# Patient Record
Sex: MALE | Race: WHITE
[De-identification: names, ages, dates, MRNs, and addresses within clinical notes are randomized per-mention and may not be internally consistent; named-entity substitution may affect disease eponyms.]

---

## 2023-02-03 ENCOUNTER — OFFICE VISIT (OUTPATIENT)
Dept: INTERNAL MEDICINE CLINIC | Facility: CLINIC | Age: 48
End: 2023-02-03
Payer: COMMERCIAL

## 2023-02-03 VITALS
HEIGHT: 73 IN | WEIGHT: 195 LBS | DIASTOLIC BLOOD PRESSURE: 86 MMHG | BODY MASS INDEX: 25.84 KG/M2 | SYSTOLIC BLOOD PRESSURE: 140 MMHG

## 2023-02-03 DIAGNOSIS — E78.5 DYSLIPIDEMIA: ICD-10-CM

## 2023-02-03 DIAGNOSIS — Z86.718 HISTORY OF VENOUS THROMBOSIS: ICD-10-CM

## 2023-02-03 DIAGNOSIS — I10 PRIMARY HYPERTENSION: Primary | ICD-10-CM

## 2023-02-03 DIAGNOSIS — G62.9 PERIPHERAL POLYNEUROPATHY: ICD-10-CM

## 2023-02-03 PROCEDURE — 3077F SYST BP >= 140 MM HG: CPT | Performed by: STUDENT IN AN ORGANIZED HEALTH CARE EDUCATION/TRAINING PROGRAM

## 2023-02-03 PROCEDURE — 99204 OFFICE O/P NEW MOD 45 MIN: CPT | Performed by: STUDENT IN AN ORGANIZED HEALTH CARE EDUCATION/TRAINING PROGRAM

## 2023-02-03 PROCEDURE — 3079F DIAST BP 80-89 MM HG: CPT | Performed by: STUDENT IN AN ORGANIZED HEALTH CARE EDUCATION/TRAINING PROGRAM

## 2023-02-03 RX ORDER — AMOXICILLIN 500 MG
2 CAPSULE ORAL DAILY
COMMUNITY

## 2023-02-03 RX ORDER — LISINOPRIL 5 MG/1
5 TABLET ORAL DAILY
Qty: 30 TABLET | Refills: 2 | Status: SHIPPED | OUTPATIENT
Start: 2023-02-03

## 2023-02-03 RX ORDER — AMLODIPINE BESYLATE 10 MG/1
TABLET ORAL
COMMUNITY
Start: 2023-01-17 | End: 2023-02-03 | Stop reason: SDUPTHER

## 2023-02-03 RX ORDER — AMLODIPINE BESYLATE 10 MG/1
TABLET ORAL
Qty: 90 TABLET | Refills: 0 | Status: SHIPPED | OUTPATIENT
Start: 2023-02-03

## 2023-02-03 NOTE — PROGRESS NOTES
SUBJECTIVE:   Ollie Parker is a 49 y.o. male seen for a visit regarding   Chief Complaint   Patient presents with    New Patient     Pt here as a new patient     Hypertension        HPI: Hasn't seen doctor in years. Works as . Has wife. HTN: Diagnosed about 5 years ago. Previously had 24-hour blood pressure monitor and was told medications were not needed. Went to urgent care 1/17/23 in [place] on business trip for pre-syncope, BP was 170/110s, EKG was normal.  He was started on amlodipine. A short time later he went to ED for flushing, due to chronic leg pain a D-dimer was ordered but not done. At home, BP running 138-140/80-90s on amlodipine. Had sleep study about 3 years ago, normal.    History of superficial venous thrombosis of right thigh years ago and has since had pain in the right leg worse with exercise. Saw vascular doctor in the past. Was put on blood thinner and clot resolved. Noted to have valve reflux on doppler and ablation was recommended however patient did not want to go forward with this. R calf localized pain ongoing for several years, no leg swelling. History of lyme disease: 8 years ago Was on antibiotic protocol (?) which triggered manic episode, was jailed and tazed twice, got rhabdomyolysis from this. Trouble emptying urine: Has seen urology in the past.    Neuro-ophthalmologist believes has ocular migraine, intermittent visual disturbance on right eye for years. R sided facial numbness for a few years, on the cheek. Also has idiopathic peripheral neuropathy on the right toes described as numbness and tingling, had nerve conduction study. Saw neurologist in the past.    Chronic low back pain intermittent for about a year. Exercise induced asthma: Chest tightness at beginning of exercise.  Had PFT done in the past which was normal.    Healthcare maintenance: colonoscopy 2020    Past Medical History, Past Surgical History, Family history, Social History, and Medications were all reviewed with the patient today and updated as necessary.        Patient Active Problem List   Diagnosis    Fatigue    Bone pain    Peripheral neuropathy    Joint pain    Memory difficulties    Environmental allergies    Hoarseness    Primary hypertension    Dyslipidemia    History of venous thrombosis     Past Surgical History:   Procedure Laterality Date    CHOLECYSTECTOMY      TONSILLECTOMY       Family History   Problem Relation Age of Onset    High Blood Pressure Mother     Depression Mother     Anxiety Disorder Mother     High Cholesterol Father     High Blood Pressure Father     Heart Disease Father     Heart Attack Father 64     Social History     Socioeconomic History    Marital status:      Spouse name: Not on file    Number of children: Not on file    Years of education: Not on file    Highest education level: Not on file   Occupational History    Not on file   Tobacco Use    Smoking status: Never    Smokeless tobacco: Never   Vaping Use    Vaping Use: Never used   Substance and Sexual Activity    Alcohol use: Never    Drug use: Never    Sexual activity: Yes     Partners: Female   Other Topics Concern    Not on file   Social History Narrative    Not on file     Social Determinants of Health     Financial Resource Strain: Not on file   Food Insecurity: Not on file   Transportation Needs: Not on file   Physical Activity: Not on file   Stress: Not on file   Social Connections: Not on file   Intimate Partner Violence: Not on file   Housing Stability: Not on file     Current Outpatient Medications   Medication Sig Dispense Refill    Omega-3 Fatty Acids (FISH OIL) 1200 MG CAPS Take 2 capsules by mouth daily      NONFORMULARY Take 40 mg by mouth daily [employer] [facility] magnesium      lisinopril (PRINIVIL;ZESTRIL) 5 MG tablet Take 1 tablet by mouth daily 30 tablet 2    amLODIPine (NORVASC) 10 MG tablet TAKE 1 TABLET BY MOUTH EVERY DAY 90 tablet 0     No current facility-administered medications for this visit. Allergies as of 02/03/2023    (No Known Allergies)         Review of Systems      OBJECTIVE:    Vitals:    02/03/23 1153   BP: (!) 140/86   Weight: 195 lb (88.5 kg)   Height: 6' 0.5\" (1.842 m)        Physical Exam  Constitutional:       General: He is not in acute distress. Appearance: Normal appearance. HENT:      Head: Normocephalic and atraumatic. Eyes:      Extraocular Movements: Extraocular movements intact. Conjunctiva/sclera: Conjunctivae normal.   Cardiovascular:      Rate and Rhythm: Normal rate and regular rhythm. Heart sounds: No murmur heard. Pulmonary:      Effort: Pulmonary effort is normal.      Breath sounds: Normal breath sounds. No wheezing, rhonchi or rales. Musculoskeletal:         General: No swelling. Comments: Varicose veins on the right lower extremity, 2+ dorsalis pedis pulses bilaterally   Skin:     General: Skin is warm and dry. Neurological:      Mental Status: He is alert. Mental status is at baseline. Psychiatric:         Mood and Affect: Mood normal.         Behavior: Behavior normal.          Medical problems and test results were reviewed with the patient today. No results found for: WBC, HGB, HCT, MCV, PLT   No results found for: NA, K, CL, CO2, BUN, CREATININE, GLUCOSE, CALCIUM    No results found for: NA, K, CL, CO2, BUN, CREATININE, GLUCOSE, CALCIUM, PROT, LABALBU, BILITOT, ALKPHOS, AST, ALT, LABGLOM, GFRAA, AGRATIO, GLOB  No results found for: TSHFT4, TSH, TSHREFLEX, TVM3EJM   No results found for: CHOL  No results found for: TRIG  No results found for: HDL  No results found for: LDLCHOLESTEROL, LDLCALC  No results found for: LABVLDL, VLDL  No results found for: CHOLHDLRATIO   No results found for: LABA1C     ASSESSMENT and PLAN     1. Primary hypertension  -     Metanephrines Plasma Free; Future  -     Aldosterone & Renin, Direct with Ratio;  Future  -     Microalbumin / Creatinine Urine Ratio; Future  -     Hemoglobin A1C; Future  -     Comprehensive Metabolic Panel; Future  -     CBC with Auto Differential; Future  -     TSH with Reflex; Future  -     Urinalysis with Reflex to Culture; Future  -     lisinopril (PRINIVIL;ZESTRIL) 5 MG tablet; Take 1 tablet by mouth daily, Disp-30 tablet, R-2Normal  -     amLODIPine (NORVASC) 10 MG tablet; TAKE 1 TABLET BY MOUTH EVERY DAY, Disp-90 tablet, R-0Normal  2. Dyslipidemia  -     Lipid Panel; Future  3. History of venous thrombosis  -     [facility]  -     Vascular duplex lower extremity venous right; Future  4. Peripheral polyneuropathy     Blood pressure still above goal, will add lisinopril to amlodipine.  Evaluate for secondary hypertension.  Get labs.  Get Doppler ultrasound of the leg and refer to vascular.    Return in about 4 weeks (around 3/3/2023).     Aliya Clinton MD

## 2023-02-07 LAB
ALBUMIN SERPL-MCNC: 4.8 G/DL (ref 4–5)
ALBUMIN/CREAT UR: <8 MG/G CREAT (ref 0–29)
ALBUMIN/GLOB SERPL: 1.9 {RATIO} (ref 1.2–2.2)
ALP SERPL-CCNC: 82 IU/L (ref 44–121)
ALT SERPL-CCNC: 32 IU/L (ref 0–44)
APPEARANCE UR: CLEAR
AST SERPL-CCNC: 24 IU/L (ref 0–40)
BACTERIA #/AREA URNS HPF: NORMAL /[HPF]
BASOPHILS # BLD AUTO: 0.1 X10E3/UL (ref 0–0.2)
BASOPHILS NFR BLD AUTO: 1 %
BILIRUB SERPL-MCNC: 0.6 MG/DL (ref 0–1.2)
BILIRUB UR QL STRIP: NEGATIVE
BUN SERPL-MCNC: 11 MG/DL (ref 6–24)
BUN/CREAT SERPL: 11 (ref 9–20)
CALCIUM SERPL-MCNC: 9.3 MG/DL (ref 8.7–10.2)
CASTS URNS QL MICRO: NORMAL /LPF
CHLORIDE SERPL-SCNC: 98 MMOL/L (ref 96–106)
CHOLEST SERPL-MCNC: 212 MG/DL (ref 100–199)
CO2 SERPL-SCNC: 21 MMOL/L (ref 20–29)
COLOR UR: YELLOW
CREAT SERPL-MCNC: 1.01 MG/DL (ref 0.76–1.27)
CREAT UR-MCNC: 38.2 MG/DL
EGFRCR SERPLBLD CKD-EPI 2021: 92 ML/MIN/1.73
EOSINOPHIL # BLD AUTO: 0.2 X10E3/UL (ref 0–0.4)
EOSINOPHIL NFR BLD AUTO: 3 %
EPI CELLS #/AREA URNS HPF: NORMAL /HPF (ref 0–10)
ERYTHROCYTE [DISTWIDTH] IN BLOOD BY AUTOMATED COUNT: 13.5 % (ref 11.6–15.4)
GLOBULIN SER CALC-MCNC: 2.5 G/DL (ref 1.5–4.5)
GLUCOSE SERPL-MCNC: 97 MG/DL (ref 70–99)
GLUCOSE UR QL STRIP: NEGATIVE
HBA1C MFR BLD: 5.6 % (ref 4.8–5.6)
HCT VFR BLD AUTO: 47.5 % (ref 37.5–51)
HDLC SERPL-MCNC: 37 MG/DL
HGB BLD-MCNC: 16.6 G/DL (ref 13–17.7)
HGB UR QL STRIP: NEGATIVE
IMM GRANULOCYTES # BLD AUTO: 0 X10E3/UL (ref 0–0.1)
IMM GRANULOCYTES NFR BLD AUTO: 0 %
KETONES UR QL STRIP: NEGATIVE
LDLC SERPL CALC-MCNC: 153 MG/DL (ref 0–99)
LEUKOCYTE ESTERASE UR QL STRIP: ABNORMAL
LYMPHOCYTES # BLD AUTO: 2.6 X10E3/UL (ref 0.7–3.1)
LYMPHOCYTES NFR BLD AUTO: 44 %
MCH RBC QN AUTO: 31.6 PG (ref 26.6–33)
MCHC RBC AUTO-ENTMCNC: 34.9 G/DL (ref 31.5–35.7)
MCV RBC AUTO: 91 FL (ref 79–97)
MICRO URNS: ABNORMAL
MICROALBUMIN UR-MCNC: <3 UG/ML
MONOCYTES # BLD AUTO: 0.5 X10E3/UL (ref 0.1–0.9)
MONOCYTES NFR BLD AUTO: 9 %
NEUTROPHILS # BLD AUTO: 2.6 X10E3/UL (ref 1.4–7)
NEUTROPHILS NFR BLD AUTO: 43 %
NITRITE UR QL STRIP: NEGATIVE
PH UR STRIP: 6.5 [PH] (ref 5–7.5)
PLATELET # BLD AUTO: 234 X10E3/UL (ref 150–450)
POTASSIUM SERPL-SCNC: 4 MMOL/L (ref 3.5–5.2)
PROT SERPL-MCNC: 7.3 G/DL (ref 6–8.5)
PROT UR QL STRIP: NEGATIVE
RBC # BLD AUTO: 5.25 X10E6/UL (ref 4.14–5.8)
RBC #/AREA URNS HPF: NORMAL /HPF (ref 0–2)
SODIUM SERPL-SCNC: 136 MMOL/L (ref 134–144)
SP GR UR STRIP: 1.01 (ref 1–1.03)
SPECIMEN STATUS REPORT: NORMAL
TRIGL SERPL-MCNC: 122 MG/DL (ref 0–149)
TSH SERPL DL<=0.005 MIU/L-ACNC: 3.28 UIU/ML (ref 0.45–4.5)
UROBILINOGEN UR STRIP-MCNC: 0.2 MG/DL (ref 0.2–1)
VLDLC SERPL CALC-MCNC: 22 MG/DL (ref 5–40)
WBC # BLD AUTO: 6 X10E3/UL (ref 3.4–10.8)
WBC #/AREA URNS HPF: NORMAL /HPF (ref 0–5)

## 2023-02-08 LAB — IMP & REVIEW OF LAB RESULTS: NORMAL

## 2023-02-11 ENCOUNTER — PATIENT MESSAGE (OUTPATIENT)
Dept: INTERNAL MEDICINE CLINIC | Facility: CLINIC | Age: 48
End: 2023-02-11

## 2023-02-12 LAB
METANEPH FREE SERPL-MCNC: 24.4 PG/ML (ref 0–88)
NORMETANEPHRINE SERPL-MCNC: 102.7 PG/ML (ref 0–218.9)

## 2023-02-17 RX ORDER — AMLODIPINE BESYLATE 5 MG/1
5 TABLET ORAL DAILY
Qty: 30 TABLET | Refills: 0 | Status: SHIPPED | OUTPATIENT
Start: 2023-02-17

## 2023-02-28 ENCOUNTER — INITIAL CONSULT (OUTPATIENT)
Dept: CARDIOLOGY CLINIC | Age: 48
End: 2023-02-28
Payer: COMMERCIAL

## 2023-02-28 VITALS
HEIGHT: 73 IN | HEART RATE: 62 BPM | WEIGHT: 196.2 LBS | DIASTOLIC BLOOD PRESSURE: 70 MMHG | SYSTOLIC BLOOD PRESSURE: 120 MMHG | BODY MASS INDEX: 26 KG/M2

## 2023-02-28 DIAGNOSIS — R55 NEAR SYNCOPE: ICD-10-CM

## 2023-02-28 DIAGNOSIS — R07.9 CHEST PAIN, UNSPECIFIED TYPE: ICD-10-CM

## 2023-02-28 DIAGNOSIS — E78.5 DYSLIPIDEMIA: ICD-10-CM

## 2023-02-28 DIAGNOSIS — I49.3 ASYMPTOMATIC PVCS: ICD-10-CM

## 2023-02-28 DIAGNOSIS — I10 PRIMARY HYPERTENSION: Primary | ICD-10-CM

## 2023-02-28 PROCEDURE — 3078F DIAST BP <80 MM HG: CPT | Performed by: INTERNAL MEDICINE

## 2023-02-28 PROCEDURE — 99203 OFFICE O/P NEW LOW 30 MIN: CPT | Performed by: INTERNAL MEDICINE

## 2023-02-28 PROCEDURE — 3074F SYST BP LT 130 MM HG: CPT | Performed by: INTERNAL MEDICINE

## 2023-02-28 PROCEDURE — 93000 ELECTROCARDIOGRAM COMPLETE: CPT | Performed by: INTERNAL MEDICINE

## 2023-02-28 ASSESSMENT — ENCOUNTER SYMPTOMS
SPUTUM PRODUCTION: 0
HOARSE VOICE: 0
CHANGE IN BOWEL HABIT: 0
NAUSEA: 0
VISUAL CHANGE: 0
COLOR CHANGE: 0
ORTHOPNEA: 0
WHEEZING: 0
COUGH: 0
SORE THROAT: 0
BOWEL INCONTINENCE: 0
HEMATEMESIS: 0
BACK PAIN: 0
ABDOMINAL PAIN: 0
HEMOPTYSIS: 0
VOMITING: 0
SWOLLEN GLANDS: 0
DIARRHEA: 0
HEMATOCHEZIA: 0
SHORTNESS OF BREATH: 0
BLURRED VISION: 0

## 2023-02-28 NOTE — PROGRESS NOTES
[facility] CARDIOLOGY  [address] [facility], [address] [facility], [address] [place] [address]  PHONE: [phone]        23    NAME:  Davis Arroyo  : 1975  MRN: [id number]       SUBJECTIVE:   Davis Arroyo is a 49 y.o. male seen for a consultation visit regarding the following: The patient is referred by Dr Yohannes Gastelum for evaluation of chest pain and near syncope. Chief Complaint   Patient presents with    Consultation    Loss of Consciousness    Hypertension            HPI:  Consultation is requested by [unfilled] for evaluation of Consultation, Loss of Consciousness, and Hypertension   . Dizziness  This is a recurrent problem. Episode onset: 4 years ago. Extensively evaluated in [facility] with CT,MRI,stress MPI scan,echo,and ilt table testing with apparently negative results. Episode frequency: Resolved 2 months with hypertension treatment. Prior to hypertension management,episodes occurred every month or so. The problem has been resolved. Associated symptoms include chest pain, fatigue and numbness (occasional hand numbness). Pertinent negatives include no abdominal pain, anorexia, arthralgias, change in bowel habit, chills, congestion, coughing, diaphoresis, fever, headaches, joint swelling, myalgias, nausea, neck pain, rash, sore throat, swollen glands, urinary symptoms, vertigo, visual change, vomiting or weakness. Nothing aggravates the symptoms. Chest Pain   This is a recurrent problem. Episode onset: 4 years ago. The problem occurs rarely. The problem has been resolved (Resolved 2 months with hypertension control). The pain is at a severity of 1/10. The pain is mild. The quality of the pain is described as dull. The pain does not radiate. Associated symptoms include dizziness, near-syncope and numbness (occasional hand numbness).  Pertinent negatives include no abdominal pain, back pain, claudication, cough, diaphoresis, exertional chest pressure, fever, headaches, hemoptysis, irregular heartbeat, leg pain, lower extremity edema, malaise/fatigue, nausea, orthopnea, palpitations, PND, shortness of breath, sputum production, syncope, vomiting or weakness. Past Medical History, Past Surgical History, Family history, Social History, and Medications were all reviewed with the patient today and updated as necessary. No Known Allergies    Current Outpatient Medications:     amLODIPine (NORVASC) 5 MG tablet, Take 1 tablet by mouth daily, Disp: 30 tablet, Rfl: 0    Omega-3 Fatty Acids (FISH OIL) 1200 MG CAPS, Take 2 capsules by mouth daily, Disp: , Rfl:     NONFORMULARY, Take 40 mg by mouth daily [employer] magnesium, Disp: , Rfl:     lisinopril (PRINIVIL;ZESTRIL) 5 MG tablet, Take 1 tablet by mouth daily, Disp: 30 tablet, Rfl: 2  Past Medical History:   Diagnosis Date    Allergic rhinitis     Anxiety     Asthma     Back problem     Blood clot in vein     GERD (gastroesophageal reflux disease)     Hyperlipidemia     Hypertension     Syncope      Past Surgical History:   Procedure Laterality Date    CHOLECYSTECTOMY      TONSILLECTOMY       Family History   Problem Relation Age of Onset    High Blood Pressure Mother     Depression Mother     Anxiety Disorder Mother     High Cholesterol Father     High Blood Pressure Father     Heart Disease Father     Heart Attack Father 64     Social History     Tobacco Use    Smoking status: Never    Smokeless tobacco: Never   Substance Use Topics    Alcohol use: Never       ROS:    Review of Systems   Constitutional: Positive for fatigue. Negative for chills, decreased appetite, diaphoresis, fever and malaise/fatigue. HENT:  Negative for congestion, hearing loss, hoarse voice, nosebleeds and sore throat. Eyes:  Negative for blurred vision. Cardiovascular:  Positive for chest pain and near-syncope. Negative for claudication, cyanosis, dyspnea on exertion, irregular heartbeat, leg swelling, orthopnea, palpitations, paroxysmal nocturnal dyspnea and syncope. Respiratory:  Negative for cough, hemoptysis, shortness of breath, sputum production and wheezing. Endocrine: Negative for polydipsia, polyphagia and polyuria. Skin:  Negative for color change and rash. Musculoskeletal:  Negative for arthralgias, back pain, joint swelling, myalgias and neck pain. Gastrointestinal:  Negative for abdominal pain, anorexia, change in bowel habit, bowel incontinence, diarrhea, hematemesis, hematochezia, nausea and vomiting. Genitourinary:  Negative for dysuria, frequency and hematuria. Neurological:  Positive for dizziness and numbness (occasional hand numbness). Negative for focal weakness, headaches, light-headedness, loss of balance, sensory change, vertigo and weakness. Psychiatric/Behavioral:  Negative for altered mental status and memory loss. PHYSICAL EXAM:   /70   Pulse 62   Ht 6' 1\" (1.854 m)   Wt 196 lb 3.2 oz (89 kg)   BMI 25.89 kg/m²      Physical Exam  Constitutional:       Appearance: Normal appearance. HENT:      Head: Normocephalic and atraumatic. Nose: Nose normal.   Eyes:      Extraocular Movements: Extraocular movements intact. Pupils: Pupils are equal, round, and reactive to light. Neck:      Vascular: No carotid bruit. Cardiovascular:      Rate and Rhythm: Regular rhythm. Pulses: Normal pulses. Heart sounds: No murmur heard. Comments: Occasional extrasystole  Pulmonary:      Effort: Pulmonary effort is normal.      Breath sounds: Normal breath sounds. Abdominal:      General: Abdomen is flat. Bowel sounds are normal.      Palpations: Abdomen is soft. Musculoskeletal:         General: Normal range of motion. Cervical back: Normal range of motion and neck supple. Comments: Mild varicose veins on RLE   Skin:     General: Skin is warm and dry. Neurological:      General: No focal deficit present. Mental Status: He is alert and oriented to person, place, and time.    Psychiatric: Mood and Affect: Mood normal.       Medical problems and test results were reviewed with the patient today.      Results for orders placed or performed in visit on 02/28/23   EKG 12 lead    Impression    Sinus  Rhythm  - frequent multiform ectopic ventricular beats   # VECs = 2, # types 2  BORDERLINE RHYTHM         Recent Results (from the past 672 hour(s))   CBC/DIFF AMBIGUOUS DEFAULT    Collection Time: 02/06/23  7:47 AM   Result Value Ref Range    WBC 6.0 3.4 - 10.8 x10E3/uL    RBC 5.25 4.14 - 5.80 x10E6/uL    Hemoglobin 16.6 13.0 - 17.7 g/dL    Hematocrit 47.5 37.5 - 51.0 %    MCV 91 79 - 97 fL    MCH 31.6 26.6 - 33.0 pg    MCHC 34.9 31.5 - 35.7 g/dL    RDW 13.5 11.6 - 15.4 %    Platelets 830 650 - 352 x10E3/uL    Neutrophils % 43 Not Estab. %    Lymphocytes % 44 Not Estab. %    Monocytes % 9 Not Estab. %    Eosinophils % 3 Not Estab. %    Basophils % 1 Not Estab. %    Neutrophils Absolute 2.6 1.4 - 7.0 x10E3/uL    Lymphocytes Absolute 2.6 0.7 - 3.1 x10E3/uL    Monocytes Absolute 0.5 0.1 - 0.9 x10E3/uL    Eosinophils Absolute 0.2 0.0 - 0.4 x10E3/uL    Basophils Absolute 0.1 0.0 - 0.2 x10E3/uL    Immature Granulocytes 0 Not Estab. %    Immature Grans (Abs) 0.0 0.0 - 0.1 x10E3/uL   Comprehensive Metabolic Panel    Collection Time: 02/06/23  7:47 AM   Result Value Ref Range    Glucose 97 70 - 99 mg/dL    BUN 11 6 - 24 mg/dL    Creatinine 1.01 0.76 - 1.27 mg/dL    Est, Glomerular Filtration Rate 92 >59 mL/min/1.73    BUN/Creatinine Ratio 11 9 - 20    Sodium 136 134 - 144 mmol/L    Potassium 4.0 3.5 - 5.2 mmol/L    Chloride 98 96 - 106 mmol/L    CO2 21 20 - 29 mmol/L    Calcium 9.3 8.7 - 10.2 mg/dL    Total Protein 7.3 6.0 - 8.5 g/dL    Albumin 4.8 4.0 - 5.0 g/dL    Globulin, Total 2.5 1.5 - 4.5 g/dL    Albumin/Globulin Ratio 1.9 1.2 - 2.2    Total Bilirubin 0.6 0.0 - 1.2 mg/dL    Alkaline Phosphatase 82 44 - 121 IU/L    AST 24 0 - 40 IU/L    ALT 32 0 - 44 IU/L   Urinalysis with Microscopic    Collection Time: 02/06/23  7:47 AM   Result Value Ref Range    Specific Gravity, UA 1.008 1.005 - 1.030    pH, UA 6.5 5.0 - 7.5    Color, Urine Yellow Yellow    Appearance Clear Clear    Leukocyte Esterase, Urine Trace (A) Negative    Protein, UA Negative Negative/Trace    Glucose, Ur Negative Negative    Ketones, Urine Negative Negative    Blood, Urine Negative Negative    Bilirubin Urine Negative Negative    Urobilinogen, Urine 0.2 0.2 - 1.0 mg/dL    Nitrite, Urine Negative Negative    Microscopic Examination See additional order    Microscopic Examination    Collection Time: 02/06/23  7:47 AM   Result Value Ref Range    WBC, UA None seen 0 - 5 /hpf    RBC, UA None seen 0 - 2 /hpf    Epithelial Cells, UA None seen 0 - 10 /hpf    Casts UA None seen None seen /lpf    Bacteria, UA None seen None seen/Few   Lipid Panel    Collection Time: 02/06/23  7:47 AM   Result Value Ref Range    Cholesterol 212 (H) 100 - 199 mg/dL    Triglycerides 122 0 - 149 mg/dL    HDL 37 (L) >39 mg/dL    VLDL Cholesterol Calculated 22 5 - 40 mg/dL    LDL Calculated 153 (H) 0 - 99 mg/dL   Hemoglobin A1C    Collection Time: 02/06/23  7:47 AM   Result Value Ref Range    Hemoglobin A1C 5.6 4.8 - 5.6 %   TSH reflex to T4    Collection Time: 02/06/23  7:47 AM   Result Value Ref Range    TSH 3.280 0.450 - 4.500 uIU/mL   SPECIMEN STATUS REPORT    Collection Time: 02/06/23  7:47 AM   Result Value Ref Range    Specimen Status Report COMMENT    Microalbumin / Creatinine Urine Ratio    Collection Time: 02/06/23  7:47 AM   Result Value Ref Range    Creatinine, Ur 38.2 Not Estab. mg/dL    Albumin, U <3.0 Not Estab. ug/mL    Microalbumin Creatinine Ratio <8 0 - 29 mg/g creat   Cardiovascular Report    Collection Time: 02/06/23  7:47 AM   Result Value Ref Range    Interpretation Note    Metanephrines Plasma Free    Collection Time: 02/06/23  7:47 AM   Result Value Ref Range    Normetanephrine 102.7 0.0 - 218.9 pg/mL    Metanephrine, Plasma 24.4 0.0 - 88.0 pg/mL   Aldosterone & Renin, Direct with Ratio    Collection Time: 02/06/23  7:47 AM   Result Value Ref Range    Aldosterone 5.4 0.0 - 30.0 ng/dL    Renin Activity 2.676 0.167 - 5.380 ng/mL/hr    ALDOSTERONE/RENIN RATIO 2.0 0.0 - 30.0     Lab Results   Component Value Date/Time    CHOL 212 02/06/2023 07:47 AM    HDL 37 02/06/2023 07:47 AM       ASSESSMENT and January Nunu was seen today for consultation, loss of consciousness and hypertension. Diagnoses and all orders for this visit:    Primary hypertension:Stable and at goal.Continue Lisinopril and Amlodipine.  -     EKG 12 lead    Dyslipidemia    Near syncope  -     Stress echocardiogram (TTE) exercise with contrast, bubble, strain, and 3D PRN; Future  -     Extended cardiac holter monitor (48 hrs - 15 days); Future    Asymptomatic PVCs  -     Stress echocardiogram (TTE) exercise with contrast, bubble, strain, and 3D PRN; Future  -     Extended cardiac holter monitor (48 hrs - 15 days); Future   Chest Pain:Resolved. -Stress Echo. Disposition:  Return for Follow up after Echo, Follow up after procedure. Thank you for allowing me to participate in this patient's care. Please call or contact me if there are any questions or concerns regarding the above.       Lilliam Levine MD  02/28/23  12:12 PM

## 2023-03-03 ENCOUNTER — OFFICE VISIT (OUTPATIENT)
Dept: INTERNAL MEDICINE CLINIC | Facility: CLINIC | Age: 48
End: 2023-03-03
Payer: COMMERCIAL

## 2023-03-03 VITALS
SYSTOLIC BLOOD PRESSURE: 132 MMHG | BODY MASS INDEX: 25.71 KG/M2 | DIASTOLIC BLOOD PRESSURE: 80 MMHG | HEART RATE: 64 BPM | OXYGEN SATURATION: 98 % | HEIGHT: 73 IN | WEIGHT: 194 LBS

## 2023-03-03 DIAGNOSIS — E78.00 PURE HYPERCHOLESTEROLEMIA: ICD-10-CM

## 2023-03-03 DIAGNOSIS — I10 ESSENTIAL HYPERTENSION: Primary | ICD-10-CM

## 2023-03-03 DIAGNOSIS — R39.11 URINARY HESITANCY: ICD-10-CM

## 2023-03-03 PROCEDURE — 3079F DIAST BP 80-89 MM HG: CPT | Performed by: STUDENT IN AN ORGANIZED HEALTH CARE EDUCATION/TRAINING PROGRAM

## 2023-03-03 PROCEDURE — 99214 OFFICE O/P EST MOD 30 MIN: CPT | Performed by: STUDENT IN AN ORGANIZED HEALTH CARE EDUCATION/TRAINING PROGRAM

## 2023-03-03 PROCEDURE — 3075F SYST BP GE 130 - 139MM HG: CPT | Performed by: STUDENT IN AN ORGANIZED HEALTH CARE EDUCATION/TRAINING PROGRAM

## 2023-03-03 SDOH — ECONOMIC STABILITY: FOOD INSECURITY: WITHIN THE PAST 12 MONTHS, THE FOOD YOU BOUGHT JUST DIDN'T LAST AND YOU DIDN'T HAVE MONEY TO GET MORE.: NEVER TRUE

## 2023-03-03 SDOH — ECONOMIC STABILITY: TRANSPORTATION INSECURITY
IN THE PAST 12 MONTHS, HAS LACK OF TRANSPORTATION KEPT YOU FROM MEETINGS, WORK, OR FROM GETTING THINGS NEEDED FOR DAILY LIVING?: NO

## 2023-03-03 SDOH — ECONOMIC STABILITY: INCOME INSECURITY: HOW HARD IS IT FOR YOU TO PAY FOR THE VERY BASICS LIKE FOOD, HOUSING, MEDICAL CARE, AND HEATING?: NOT HARD AT ALL

## 2023-03-03 SDOH — ECONOMIC STABILITY: HOUSING INSECURITY
IN THE LAST 12 MONTHS, WAS THERE A TIME WHEN YOU DID NOT HAVE A STEADY PLACE TO SLEEP OR SLEPT IN A SHELTER (INCLUDING NOW)?: NO

## 2023-03-03 SDOH — ECONOMIC STABILITY: FOOD INSECURITY: WITHIN THE PAST 12 MONTHS, YOU WORRIED THAT YOUR FOOD WOULD RUN OUT BEFORE YOU GOT MONEY TO BUY MORE.: NEVER TRUE

## 2023-03-03 ASSESSMENT — PATIENT HEALTH QUESTIONNAIRE - PHQ9
SUM OF ALL RESPONSES TO PHQ QUESTIONS 1-9: 0
SUM OF ALL RESPONSES TO PHQ QUESTIONS 1-9: 0
2. FEELING DOWN, DEPRESSED OR HOPELESS: 0
SUM OF ALL RESPONSES TO PHQ9 QUESTIONS 1 & 2: 0
SUM OF ALL RESPONSES TO PHQ QUESTIONS 1-9: 0
SUM OF ALL RESPONSES TO PHQ QUESTIONS 1-9: 0
1. LITTLE INTEREST OR PLEASURE IN DOING THINGS: 0

## 2023-03-03 ASSESSMENT — ENCOUNTER SYMPTOMS
VOMITING: 0
ABDOMINAL PAIN: 0
NAUSEA: 0
SHORTNESS OF BREATH: 0

## 2023-03-03 NOTE — PROGRESS NOTES
FOLLOW UP VISIT    Subjective:    Cameron Cabrera (: 1975) is a 49 y.o., male,   Chief Complaint   Patient presents with    Hypertension       HPI:    HTN: Taking lisinopril, amlodipine. At home, BP has been 117/70, up to 130s rarely. BP in office today 132/80, is usually slightly higher in doctor's office. Muscle spasm/fluttering of the left ear. Ongoing for about 2 weeks. He wonders if this is side effect from antihypertensive. Occurring almost daily. No ear pain, discharge. Labs reviewed. , metanephrines and aldosterone renin ratio unremarkable. Labs otherwise unremarkable. Father had MI age 62 or 61    Difficulty initiating urination, ongoing for years and is frustrating. No nocturia. Urinalysis was normal.  History of hydroceles. Chest pain, dizziness: Extensive work-up in [place] years ago. he has heart monitor, is going to get stress echo with cardiology. R calf pain, history of superficial venous thrombosis of R thigh treated with blood thinner: Went to [facility] vein, seeing Dr. Sabino Kathleen this month to have vein cauterization. History of dysphagia: saw GI in the past, was told he had hernia (hiatal?). Healthcare maintenance: Started walking. Colonoscopy , found polyp    Other medical history:  -History of lyme disease: 8 years ago was on antibiotic protocol (?) which triggered manic episode, was jailed and tazed twice, got rhabdomyolysis from this.  -Neuro-ophthalmologist believes has ocular migraine, intermittent visual disturbance on right eye for years.  -R sided facial numbness for a few years, on the cheek. Also has idiopathic peripheral neuropathy on the right toes described as numbness and tingling, had nerve conduction study. Saw neurologist in the past.  -Chronic low back pain intermittent for about a year. Exercise induced asthma: Chest tightness at beginning of exercise.  Had PFT done in the past which was normal.  The following portions of the patient's history were reviewed and updated as appropriate:      Patient Active Problem List   Diagnosis    Fatigue    Bone pain    Peripheral neuropathy    Joint pain    Memory difficulties    Environmental allergies    Hoarseness    Essential hypertension    Dyslipidemia    History of venous thrombosis    Near syncope    Asymptomatic PVCs    Chest pain    Pure hypercholesterolemia    Urinary hesitancy     Past Surgical History:   Procedure Laterality Date    CHOLECYSTECTOMY      TONSILLECTOMY       Family History   Problem Relation Age of Onset    High Blood Pressure Mother     Depression Mother     Anxiety Disorder Mother     High Cholesterol Father     High Blood Pressure Father     Heart Disease Father     Heart Attack Father 64     Social History     Socioeconomic History    Marital status:      Spouse name: Not on file    Number of children: Not on file    Years of education: Not on file    Highest education level: Not on file   Occupational History    Not on file   Tobacco Use    Smoking status: Never    Smokeless tobacco: Never   Vaping Use    Vaping Use: Never used   Substance and Sexual Activity    Alcohol use: Never    Drug use: Never    Sexual activity: Yes     Partners: Female   Other Topics Concern    Not on file   Social History Narrative    Not on file     Social Determinants of Health     Financial Resource Strain: Not on file   Food Insecurity: Not on file   Transportation Needs: Not on file   Physical Activity: Not on file   Stress: Not on file   Social Connections: Not on file   Intimate Partner Violence: Not on file   Housing Stability: Not on file     Current Outpatient Medications   Medication Sig Dispense Refill    amLODIPine (NORVASC) 5 MG tablet Take 1 tablet by mouth daily 30 tablet 0    Omega-3 Fatty Acids (FISH OIL) 1200 MG CAPS Take 2 capsules by mouth daily      NONFORMULARY Take 40 mg by mouth daily [employer]' Best magnesium       No current facility-administered medications for this visit. Allergies as of 03/03/2023    (No Known Allergies)       Review of Systems   Constitutional:  Negative for chills and fever. Respiratory:  Negative for shortness of breath. Cardiovascular:  Negative for chest pain. Gastrointestinal:  Negative for abdominal pain, nausea and vomiting. Objective:    Vitals:    03/03/23 1033   BP: 132/80   Site: Left Upper Arm   Position: Sitting   Cuff Size: Large Adult   Pulse: 64   SpO2: 98%   Weight: 194 lb (88 kg)   Height: 6' 1\" (1.854 m)        Physical Exam  Constitutional:       General: He is not in acute distress. Appearance: Normal appearance. HENT:      Head: Normocephalic and atraumatic. Eyes:      Extraocular Movements: Extraocular movements intact. Conjunctiva/sclera: Conjunctivae normal.   Cardiovascular:      Rate and Rhythm: Normal rate and regular rhythm. Heart sounds: No murmur heard. Pulmonary:      Effort: Pulmonary effort is normal.      Breath sounds: Normal breath sounds. No wheezing, rhonchi or rales. Skin:     General: Skin is warm and dry. Neurological:      Mental Status: He is alert. Mental status is at baseline.    Psychiatric:         Mood and Affect: Mood normal.         Behavior: Behavior normal.       Lab Results   Component Value Date    WBC 6.0 02/06/2023    HGB 16.6 02/06/2023    HCT 47.5 02/06/2023    MCV 91 02/06/2023     02/06/2023      Lab Results   Component Value Date/Time     02/06/2023 07:47 AM    K 4.0 02/06/2023 07:47 AM    CL 98 02/06/2023 07:47 AM    CO2 21 02/06/2023 07:47 AM    BUN 11 02/06/2023 07:47 AM    CREATININE 1.01 02/06/2023 07:47 AM    GLUCOSE 97 02/06/2023 07:47 AM    CALCIUM 9.3 02/06/2023 07:47 AM       Lab Results   Component Value Date     02/06/2023    K 4.0 02/06/2023    CL 98 02/06/2023    CO2 21 02/06/2023    BUN 11 02/06/2023    CREATININE 1.01 02/06/2023    GLUCOSE 97 02/06/2023    CALCIUM 9.3 02/06/2023    PROT 7.3 02/06/2023    LABALBU 4.8 02/06/2023    LABALBU <3.0 02/06/2023    BILITOT 0.6 02/06/2023    ALKPHOS 82 02/06/2023    AST 24 02/06/2023    ALT 32 02/06/2023    LABGLOM 92 02/06/2023    AGRATIO 1.9 02/06/2023     Lab Results   Component Value Date    TSH 3.280 02/06/2023      Lab Results   Component Value Date    CHOL 212 (H) 02/06/2023     Lab Results   Component Value Date    TRIG 122 02/06/2023     Lab Results   Component Value Date    HDL 37 (L) 02/06/2023     Lab Results   Component Value Date    LDLCALC 153 (H) 02/06/2023     Lab Results   Component Value Date    LABVLDL 22 02/06/2023     No results found for: CHOLHDLRATIO   Hemoglobin A1C   Date Value Ref Range Status   02/06/2023 5.6 4.8 - 5.6 % Final     Comment:              Prediabetes: 5.7 - 6.4           Diabetes: >6.4           Glycemic control for adults with diabetes: <7.0          Assessent & Plan    1. Essential hypertension  2. Pure hypercholesterolemia  3. Urinary hesitancy  -     [facility], [place]     10-year risk of ASCVD is 5.3%, less than 7.5% therefore no compelling reason for statin currently. We discussed dietary measures cholesterol. Due to family history, may consider statin in the future. Reasonable to stop lisinopril and monitor blood pressures at home to see if ear muscle spasm improves, he will let us know if it does not or blood pressure increases. Refer to urology for urinary symptoms. The 10-year ASCVD risk score (Jules FONSECA, et al., 2019) is: 5.3%    Values used to calculate the score:      Age: 49 years      Sex: Male      Is Non- : No      Diabetic: No      Tobacco smoker: No      Systolic Blood Pressure: 020 mmHg      Is BP treated: Yes      HDL Cholesterol: 37 mg/dL      Total Cholesterol: 212 mg/dL      The patient and/or patient representative voiced understanding and agreement with the current diagnoses, recommendations, and possible side effects.     Return in about 3 months (around 6/3/2023) for physical.     Lori Casey MD

## 2023-03-20 RX ORDER — AMLODIPINE BESYLATE 5 MG/1
TABLET ORAL
Qty: 90 TABLET | Refills: 1 | Status: SHIPPED | OUTPATIENT
Start: 2023-03-20

## 2023-04-21 ENCOUNTER — OFFICE VISIT (OUTPATIENT)
Dept: UROLOGY | Age: 48
End: 2023-04-21

## 2023-04-21 DIAGNOSIS — E78.5 DYSLIPIDEMIA: Primary | ICD-10-CM

## 2023-04-21 DIAGNOSIS — N50.82 SCROTAL PAIN: ICD-10-CM

## 2023-04-21 LAB
BILIRUBIN, URINE, POC: NEGATIVE
BLOOD URINE, POC: NEGATIVE
GLUCOSE URINE, POC: NEGATIVE
KETONES, URINE, POC: NEGATIVE
LEUKOCYTE ESTERASE, URINE, POC: NEGATIVE
NITRITE, URINE, POC: NEGATIVE
PH, URINE, POC: 6.5 (ref 4.6–8)
PROTEIN,URINE, POC: NEGATIVE
SPECIFIC GRAVITY, URINE, POC: 1.02 (ref 1–1.03)
URINALYSIS CLARITY, POC: NORMAL
URINALYSIS COLOR, POC: NORMAL
UROBILINOGEN, POC: NORMAL

## 2023-04-21 RX ORDER — CIPROFLOXACIN 500 MG/1
500 TABLET, FILM COATED ORAL 2 TIMES DAILY
Qty: 14 TABLET | Refills: 0 | Status: SHIPPED | OUTPATIENT
Start: 2023-04-21 | End: 2023-04-28

## 2023-04-21 ASSESSMENT — ENCOUNTER SYMPTOMS
HEARTBURN: 0
ABDOMINAL PAIN: 0
SKIN LESIONS: 0
INDIGESTION: 0
DIARRHEA: 0
CONSTIPATION: 0
BACK PAIN: 0
SHORTNESS OF BREATH: 0
EYE DISCHARGE: 0
BLOOD IN STOOL: 0
VOMITING: 0
COUGH: 0
NAUSEA: 0
EYE PAIN: 0
WHEEZING: 0

## 2023-04-21 NOTE — PROGRESS NOTES
[facility]  [address]  [address] [facility] [address] [facility]  [phone]    Lawrence Daniels  : 1975    Chief Complaint   Patient presents with    New Patient        HPI     Lawrence Daniels is a 49 y.o. male was last seen by me in 2016; note: transferring care from [facility] for prostatitis and LUTS. Last course of antibiotics for prostatitis was 2015 with Dr. Susanne Rea. Symptoms usually improve on antibiotics. He currently is having a dull ache in rectal ache, weak stream, RLQ pain and left upper back pain. Also pain with intercourse. Drink about 4-5 cups of coffee per day. No history of hernias. Denies family history of prostate cancer. He has tried TXU Luis Antonio but not alpha blockers. Most recent PSA 0.7 on 14. He has had an extensive urologic work-up Including cystoscopy and urodymaics several year by Dr. Lisa Stevens (these records are not available). Also a renal US at [facility]. Was given Cipro and Tamsulosin in 11-15 by NP. He is better today,did not take the Tamuslosin. Is concerned about a spermatocele which he has had for some time. It is occasionally painful, no pain today. Referred back by Dr. Trista Landon for evaluation and treatment of urinary hesitancy. He has urinary hesitancy since his 20's,. Has left groin pain. , bilateral low back pain. No dysuria. Has pain in left scrotum after sex.    Past Medical History:   Diagnosis Date    Allergic rhinitis     Anxiety     Asthma     Back problem     Blood clot in vein     GERD (gastroesophageal reflux disease)     Hyperlipidemia     Hypertension      Past Surgical History:   Procedure Laterality Date    CHOLECYSTECTOMY      TONSILLECTOMY       Current Outpatient Medications   Medication Sig Dispense Refill    ciprofloxacin (CIPRO) 500 MG tablet Take 1 tablet by mouth 2 times daily for 7 days 14 tablet 0    lisinopril (PRINIVIL;ZESTRIL) 5 MG tablet Take 1 tablet by mouth daily      metoprolol succinate (TOPROL XL)

## 2023-04-28 ENCOUNTER — HOSPITAL ENCOUNTER (OUTPATIENT)
Dept: ULTRASOUND IMAGING | Age: 48
End: 2023-04-28
Payer: COMMERCIAL

## 2023-04-28 DIAGNOSIS — N50.82 SCROTAL PAIN: ICD-10-CM

## 2023-04-28 PROCEDURE — 76870 US EXAM SCROTUM: CPT

## 2023-05-03 DIAGNOSIS — I10 ESSENTIAL HYPERTENSION: ICD-10-CM

## 2023-05-03 RX ORDER — LISINOPRIL 5 MG/1
TABLET ORAL
Qty: 90 TABLET | Refills: 1 | Status: SHIPPED | OUTPATIENT
Start: 2023-05-03 | End: 2023-06-09 | Stop reason: ALTCHOICE

## 2023-05-09 ENCOUNTER — TELEPHONE (OUTPATIENT)
Dept: UROLOGY | Age: 48
End: 2023-05-09

## 2023-05-23 ENCOUNTER — OFFICE VISIT (OUTPATIENT)
Age: 48
End: 2023-05-23
Payer: COMMERCIAL

## 2023-05-23 VITALS
WEIGHT: 194 LBS | SYSTOLIC BLOOD PRESSURE: 132 MMHG | DIASTOLIC BLOOD PRESSURE: 78 MMHG | HEART RATE: 60 BPM | HEIGHT: 73 IN | BODY MASS INDEX: 25.71 KG/M2

## 2023-05-23 DIAGNOSIS — I10 ESSENTIAL HYPERTENSION: Primary | ICD-10-CM

## 2023-05-23 DIAGNOSIS — I49.3 FREQUENT PVCS: ICD-10-CM

## 2023-05-23 PROCEDURE — 3078F DIAST BP <80 MM HG: CPT | Performed by: INTERNAL MEDICINE

## 2023-05-23 PROCEDURE — 99213 OFFICE O/P EST LOW 20 MIN: CPT | Performed by: INTERNAL MEDICINE

## 2023-05-23 PROCEDURE — 3075F SYST BP GE 130 - 139MM HG: CPT | Performed by: INTERNAL MEDICINE

## 2023-05-23 ASSESSMENT — ENCOUNTER SYMPTOMS
SPUTUM PRODUCTION: 0
BLURRED VISION: 0
ORTHOPNEA: 0
HEMATEMESIS: 0
HOARSE VOICE: 0
BOWEL INCONTINENCE: 0
ABDOMINAL PAIN: 0
VOMITING: 0
COUGH: 0
NAUSEA: 0
HEMATOCHEZIA: 0
COLOR CHANGE: 0
WHEEZING: 0
SHORTNESS OF BREATH: 0
DIARRHEA: 0

## 2023-05-23 NOTE — PROGRESS NOTES
[facility] CARDIOLOGY  [address] [facility], [address]  PHONE: [phone]        23        NAME:  Whitney Hameed  : 1975  MRN: [id number]       SUBJECTIVE:   Whitney Hameed is a 49 y.o. male seen for a follow up visit regarding the following: Frequent PVC's and primary hypertension. Last month,low dose Toprol was added due to frequent PVC's (12.1% burden on recent extended holter monitor). He returns for follow up after medication change. Overall,he reports feeling the same. Chief Complaint   Patient presents with    Hypertension       HPI:    Palpitations   The problem has been unchanged. Nothing aggravates the symptoms. Associated symptoms include chest pain (occasioanl dull left sided chest pain that is worse with sugar consumption?). Pertinent negatives include no anxiety, chest fullness, coughing, diaphoresis, dizziness, fever, irregular heartbeat, malaise/fatigue, nausea, near-syncope, numbness, shortness of breath, syncope, vomiting or weakness. Past Medical History, Past Surgical History, Family history, Social History, and Medications were all reviewed with the patient today and updated as necessary.          Current Outpatient Medications:     lisinopril (PRINIVIL;ZESTRIL) 5 MG tablet, TAKE 1 TABLET BY MOUTH EVERY DAY, Disp: 90 tablet, Rfl: 1    metoprolol succinate (TOPROL XL) 25 MG extended release tablet, Take 1 tablet by mouth daily, Disp: 30 tablet, Rfl: 5    amLODIPine (NORVASC) 5 MG tablet, TAKE 1 TABLET BY MOUTH EVERY DAY, Disp: 90 tablet, Rfl: 1    NONFORMULARY, Take 40 mg by mouth daily [employer]' Best magnesium, Disp: , Rfl:     Omega-3 Fatty Acids (FISH OIL) 1200 MG CAPS, Take 2,400 mg by mouth daily (Patient not taking: Reported on 2023), Disp: , Rfl:   No Known Allergies  Past Medical History:   Diagnosis Date    Allergic rhinitis     Anxiety     Asthma     Back problem     Blood clot in vein     GERD (gastroesophageal reflux disease)

## 2023-06-02 DIAGNOSIS — E78.5 DYSLIPIDEMIA: ICD-10-CM

## 2023-06-02 DIAGNOSIS — I10 PRIMARY HYPERTENSION: ICD-10-CM

## 2023-06-02 LAB
ALBUMIN SERPL-MCNC: 4.1 G/DL (ref 3.5–5)
ALBUMIN/GLOB SERPL: 1.2 (ref 0.4–1.6)
ALP SERPL-CCNC: 85 U/L (ref 50–136)
ALT SERPL-CCNC: 29 U/L (ref 12–65)
ANION GAP SERPL CALC-SCNC: 5 MMOL/L (ref 2–11)
APPEARANCE UR: CLEAR
AST SERPL-CCNC: 19 U/L (ref 15–37)
BACTERIA URNS QL MICRO: NEGATIVE /HPF
BASOPHILS # BLD: 0.1 K/UL (ref 0–0.2)
BASOPHILS NFR BLD: 1 % (ref 0–2)
BILIRUB SERPL-MCNC: 0.7 MG/DL (ref 0.2–1.1)
BILIRUB UR QL: NEGATIVE
BUN SERPL-MCNC: 11 MG/DL (ref 6–23)
CALCIUM SERPL-MCNC: 10.2 MG/DL (ref 8.3–10.4)
CASTS URNS QL MICRO: NORMAL /LPF (ref 0–2)
CHLORIDE SERPL-SCNC: 104 MMOL/L (ref 101–110)
CHOLEST SERPL-MCNC: 211 MG/DL
CO2 SERPL-SCNC: 28 MMOL/L (ref 21–32)
COLOR UR: NORMAL
CREAT SERPL-MCNC: 1.1 MG/DL (ref 0.8–1.5)
CREAT UR-MCNC: 28 MG/DL
DIFFERENTIAL METHOD BLD: NORMAL
EOSINOPHIL # BLD: 0.2 K/UL (ref 0–0.8)
EOSINOPHIL NFR BLD: 3 % (ref 0.5–7.8)
EPI CELLS #/AREA URNS HPF: NORMAL /HPF (ref 0–5)
ERYTHROCYTE [DISTWIDTH] IN BLOOD BY AUTOMATED COUNT: 12.7 % (ref 11.9–14.6)
EST. AVERAGE GLUCOSE BLD GHB EST-MCNC: 108 MG/DL
GLOBULIN SER CALC-MCNC: 3.3 G/DL (ref 2.8–4.5)
GLUCOSE SERPL-MCNC: 88 MG/DL (ref 65–100)
GLUCOSE UR STRIP.AUTO-MCNC: NEGATIVE MG/DL
HBA1C MFR BLD: 5.4 % (ref 4.8–5.6)
HCT VFR BLD AUTO: 48.1 % (ref 41.1–50.3)
HDLC SERPL-MCNC: 38 MG/DL (ref 40–60)
HDLC SERPL: 5.6
HGB BLD-MCNC: 16.2 G/DL (ref 13.6–17.2)
HGB UR QL STRIP: NEGATIVE
IMM GRANULOCYTES # BLD AUTO: 0 K/UL (ref 0–0.5)
IMM GRANULOCYTES NFR BLD AUTO: 0 % (ref 0–5)
KETONES UR QL STRIP.AUTO: NEGATIVE MG/DL
LDLC SERPL CALC-MCNC: 146 MG/DL
LEUKOCYTE ESTERASE UR QL STRIP.AUTO: NEGATIVE
LYMPHOCYTES # BLD: 2.2 K/UL (ref 0.5–4.6)
LYMPHOCYTES NFR BLD: 39 % (ref 13–44)
MCH RBC QN AUTO: 31.5 PG (ref 26.1–32.9)
MCHC RBC AUTO-ENTMCNC: 33.7 G/DL (ref 31.4–35)
MCV RBC AUTO: 93.6 FL (ref 82–102)
MICROALBUMIN UR-MCNC: <0.5 MG/DL
MICROALBUMIN/CREAT UR-RTO: NORMAL MG/G (ref 0–30)
MONOCYTES # BLD: 0.5 K/UL (ref 0.1–1.3)
MONOCYTES NFR BLD: 9 % (ref 4–12)
MUCOUS THREADS URNS QL MICRO: 0 /LPF
NEUTS SEG # BLD: 2.7 K/UL (ref 1.7–8.2)
NEUTS SEG NFR BLD: 48 % (ref 43–78)
NITRITE UR QL STRIP.AUTO: NEGATIVE
NRBC # BLD: 0 K/UL (ref 0–0.2)
PH UR STRIP: 7 (ref 5–9)
PLATELET # BLD AUTO: 186 K/UL (ref 150–450)
PMV BLD AUTO: 11.7 FL (ref 9.4–12.3)
POTASSIUM SERPL-SCNC: 4.2 MMOL/L (ref 3.5–5.1)
PROT SERPL-MCNC: 7.4 G/DL (ref 6.3–8.2)
PROT UR STRIP-MCNC: NEGATIVE MG/DL
RBC # BLD AUTO: 5.14 M/UL (ref 4.23–5.6)
RBC #/AREA URNS HPF: NORMAL /HPF (ref 0–5)
SODIUM SERPL-SCNC: 137 MMOL/L (ref 133–143)
SP GR UR REFRACTOMETRY: 1 (ref 1–1.02)
TRIGL SERPL-MCNC: 135 MG/DL (ref 35–150)
TSH W FREE THYROID IF ABNORMAL: 2 UIU/ML (ref 0.36–3.74)
URINE CULTURE IF INDICATED: NORMAL
UROBILINOGEN UR QL STRIP.AUTO: 0.2 EU/DL (ref 0.2–1)
VLDLC SERPL CALC-MCNC: 27 MG/DL (ref 6–23)
WBC # BLD AUTO: 5.7 K/UL (ref 4.3–11.1)
WBC URNS QL MICRO: NORMAL /HPF (ref 0–4)

## 2023-06-09 ENCOUNTER — OFFICE VISIT (OUTPATIENT)
Dept: INTERNAL MEDICINE CLINIC | Facility: CLINIC | Age: 48
End: 2023-06-09
Payer: COMMERCIAL

## 2023-06-09 VITALS
WEIGHT: 190.8 LBS | SYSTOLIC BLOOD PRESSURE: 108 MMHG | HEIGHT: 73 IN | BODY MASS INDEX: 25.29 KG/M2 | DIASTOLIC BLOOD PRESSURE: 68 MMHG | HEART RATE: 50 BPM | OXYGEN SATURATION: 97 %

## 2023-06-09 DIAGNOSIS — I10 ESSENTIAL HYPERTENSION: ICD-10-CM

## 2023-06-09 DIAGNOSIS — I49.3 FREQUENT PVCS: ICD-10-CM

## 2023-06-09 DIAGNOSIS — Z12.11 ENCOUNTER FOR SCREENING FOR MALIGNANT NEOPLASM OF COLON: ICD-10-CM

## 2023-06-09 DIAGNOSIS — E78.5 DYSLIPIDEMIA: ICD-10-CM

## 2023-06-09 DIAGNOSIS — J34.2 DEVIATED SEPTUM: ICD-10-CM

## 2023-06-09 DIAGNOSIS — Z00.00 ANNUAL PHYSICAL EXAM: Primary | ICD-10-CM

## 2023-06-09 DIAGNOSIS — Z86.718 HISTORY OF VENOUS THROMBOSIS: ICD-10-CM

## 2023-06-09 PROBLEM — E78.00 PURE HYPERCHOLESTEROLEMIA: Status: RESOLVED | Noted: 2023-03-03 | Resolved: 2023-06-09

## 2023-06-09 LAB
METANEPH FREE SERPL-MCNC: 41.7 PG/ML (ref 0–88)
NORMETANEPHRINE SERPL-MCNC: 146 PG/ML (ref 0–218.9)

## 2023-06-09 PROCEDURE — 3074F SYST BP LT 130 MM HG: CPT | Performed by: STUDENT IN AN ORGANIZED HEALTH CARE EDUCATION/TRAINING PROGRAM

## 2023-06-09 PROCEDURE — 99396 PREV VISIT EST AGE 40-64: CPT | Performed by: STUDENT IN AN ORGANIZED HEALTH CARE EDUCATION/TRAINING PROGRAM

## 2023-06-09 PROCEDURE — 3078F DIAST BP <80 MM HG: CPT | Performed by: STUDENT IN AN ORGANIZED HEALTH CARE EDUCATION/TRAINING PROGRAM

## 2023-06-09 ASSESSMENT — PATIENT HEALTH QUESTIONNAIRE - PHQ9
SUM OF ALL RESPONSES TO PHQ9 QUESTIONS 1 & 2: 0
SUM OF ALL RESPONSES TO PHQ QUESTIONS 1-9: 0
SUM OF ALL RESPONSES TO PHQ QUESTIONS 1-9: 0
1. LITTLE INTEREST OR PLEASURE IN DOING THINGS: 0
SUM OF ALL RESPONSES TO PHQ QUESTIONS 1-9: 0
SUM OF ALL RESPONSES TO PHQ QUESTIONS 1-9: 0
2. FEELING DOWN, DEPRESSED OR HOPELESS: 0

## 2023-06-09 ASSESSMENT — ENCOUNTER SYMPTOMS: SHORTNESS OF BREATH: 0

## 2023-06-09 NOTE — PROGRESS NOTES
Hemoglobin A1C   Date Value Ref Range Status   06/02/2023 5.4 4.8 - 5.6 % Final        Assessent & Plan    1. Annual physical exam  2. Deviated septum  -     [address] [facility], [place]  3. Encounter for screening for malignant neoplasm of colon  -     External Referral To Gastroenterology  4. Essential hypertension  5. Dyslipidemia  6. Frequent PVCs  7. History of venous thrombosis     Encouraged to follow-up with his vascular doctor for bruise. I feel that he would benefit from statin, I encouraged for him to discuss further with his cardiologist or if repeat calcium score would be warranted. Would be helpful to have records of prior calcium scores. Refer to ENT for history of deviated nasal septum. Refer to GI for screening colonoscopy. Discussed BMI and healthy weight and diet, exercise, and medication compliance. Reviewed appropriate health maintenance screening. The patient was counseled regarding screening procedures and recommended schedules for GI hemoccult testing/colonoscopy, and recommended vaccinations. The patient and/or patient representative voiced understanding and agreement with the current diagnoses, recommendations, and possible side effects. Return in about 6 months (around 12/9/2023) for with fasting labs 1 week prior, routine follow up.      Lamar Jean MD

## 2023-06-11 LAB
ALDOST SERPL-MCNC: 3.4 NG/DL (ref 0–30)
ALDOST/RENIN PLAS-RTO: 3 (ref 0–30)
RENIN PLAS-CCNC: 1.14 NG/ML/HR (ref 0.17–5.38)

## 2023-06-22 ENCOUNTER — OFFICE VISIT (OUTPATIENT)
Dept: ENT CLINIC | Age: 48
End: 2023-06-22
Payer: COMMERCIAL

## 2023-06-22 VITALS — BODY MASS INDEX: 25.31 KG/M2 | HEIGHT: 73 IN | WEIGHT: 191 LBS

## 2023-06-22 DIAGNOSIS — J34.2 NASAL SEPTAL DEVIATION: ICD-10-CM

## 2023-06-22 DIAGNOSIS — J30.9 ALLERGIC RHINITIS, UNSPECIFIED SEASONALITY, UNSPECIFIED TRIGGER: ICD-10-CM

## 2023-06-22 DIAGNOSIS — M26.609 TMJ (TEMPOROMANDIBULAR JOINT DISORDER): ICD-10-CM

## 2023-06-22 DIAGNOSIS — K21.9 LARYNGOPHARYNGEAL REFLUX (LPR): Primary | ICD-10-CM

## 2023-06-22 DIAGNOSIS — H93.8X3 EAR PRESSURE, BILATERAL: ICD-10-CM

## 2023-06-22 DIAGNOSIS — R49.0 HOARSENESS: ICD-10-CM

## 2023-06-22 PROCEDURE — 92567 TYMPANOMETRY: CPT | Performed by: STUDENT IN AN ORGANIZED HEALTH CARE EDUCATION/TRAINING PROGRAM

## 2023-06-22 PROCEDURE — 99204 OFFICE O/P NEW MOD 45 MIN: CPT | Performed by: STUDENT IN AN ORGANIZED HEALTH CARE EDUCATION/TRAINING PROGRAM

## 2023-06-22 PROCEDURE — 31575 DIAGNOSTIC LARYNGOSCOPY: CPT | Performed by: STUDENT IN AN ORGANIZED HEALTH CARE EDUCATION/TRAINING PROGRAM

## 2023-06-22 RX ORDER — FLUTICASONE PROPIONATE 50 MCG
2 SPRAY, SUSPENSION (ML) NASAL DAILY
Qty: 48 G | Refills: 5 | Status: SHIPPED | OUTPATIENT
Start: 2023-06-22

## 2023-06-22 RX ORDER — OMEPRAZOLE 40 MG/1
40 CAPSULE, DELAYED RELEASE ORAL
Qty: 90 CAPSULE | Refills: 1 | Status: SHIPPED | OUTPATIENT
Start: 2023-06-22 | End: 2023-06-22 | Stop reason: SDUPTHER

## 2023-06-22 RX ORDER — OMEPRAZOLE 40 MG/1
40 CAPSULE, DELAYED RELEASE ORAL
Qty: 90 CAPSULE | Refills: 5 | Status: SHIPPED | OUTPATIENT
Start: 2023-06-22

## 2023-06-22 ASSESSMENT — ENCOUNTER SYMPTOMS
SINUS PRESSURE: 1
ABDOMINAL PAIN: 0
COUGH: 1
EYE DISCHARGE: 0

## 2023-06-22 NOTE — PROGRESS NOTES
[address] ENT Office Note    Patient: Frankie Macario  MRN: [id number]  : 1975  Gender:  male  Vital Signs: Ht 6' 1\" (1.854 m)   Wt 191 lb (86.6 kg)   BMI 25.20 kg/m²   Date: 2023    CC:   Chief Complaint   Patient presents with    New Patient     Patient here for ear issues and sinus pressure. He also states he has a raspy voice and some nodules in his throat. HPI:  Frankie Macario is a 49 y.o. male who has dysphonia. He endorses throat tightness. This has been ongoing for 3 years. He endorses right ear pain. He has pressure in his ears. He has occasional. He clenches his jaw and grinds his. He has GERD and takes meds PRN. He denies dysphagia. He denies nasal obstruction. Past Medical History, Past Surgical History, Family history, Social History, and Medications were all reviewed with the patient today and updated as necessary. No Known Allergies  Patient Active Problem List   Diagnosis    Fatigue    Bone pain    Peripheral neuropathy    Joint pain    Memory difficulties    Environmental allergies    Hoarseness    Essential hypertension    Dyslipidemia    History of venous thrombosis    Near syncope    Frequent PVCs    Chest pain    Urinary hesitancy    Deviated septum     Current Outpatient Medications   Medication Sig    omeprazole (PRILOSEC) 40 MG delayed release capsule Take 1 capsule by mouth every morning (before breakfast)    fluticasone (FLONASE) 50 MCG/ACT nasal spray 2 sprays by Each Nostril route daily    metoprolol succinate (TOPROL XL) 25 MG extended release tablet Take 1 tablet by mouth daily    amLODIPine (NORVASC) 5 MG tablet TAKE 1 TABLET BY MOUTH EVERY DAY    Omega-3 Fatty Acids (FISH OIL) 1200 MG CAPS Take 2,400 mg by mouth daily    NONFORMULARY Take 40 mg by mouth daily [employer]' Best magnesium     No current facility-administered medications for this visit.      Past Medical History:   Diagnosis Date    Allergic rhinitis     Anxiety     Asthma     Back problem     Blood

## 2023-08-16 ENCOUNTER — TELEPHONE (OUTPATIENT)
Age: 48
End: 2023-08-16

## 2023-08-16 NOTE — TELEPHONE ENCOUNTER
----- Message from Jarrod [place], [place] sent at 8/16/2023  8:00 AM EDT -----  Regarding: FW: Metoprolol Succ ER 25 Mg  Contact: [phone]    ----- Message -----  From: Agusto Diehl MA  Sent: 8/16/2023   7:40 AM EDT  To: Alli Mcarthur MA  Subject: FW: Metoprolol Succ ER 25 Mg                       ----- Message -----  From: Bryanna Berumen  Sent: 8/15/2023   6:13 PM EDT  To: [facility] Clinical Staff  Subject: Metoprolol Succ ER 25 Mg                         Dr. Ivon Ricketts -    I had to stop taking this medicine as it was causing me unusual tiredness and extreme moments of fatigue during daytime. Was concerned about dosing off at wheel while driving as well. Please advise on a path forward.     Thanks,  Sharon Lopez

## 2023-08-16 NOTE — TELEPHONE ENCOUNTER
Per Dr. James Nair, can stay off the Metoprolol for now. Called/LMOM regarding above, can stay off Metoprolol for now. If palpitations become worse to contact our office or if has questions.

## 2023-08-16 NOTE — TELEPHONE ENCOUNTER
----- Message from Carlo Sotoviki [place] sent at 8/16/2023  8:00 AM EDT -----  Regarding: FW: Metoprolol Succ ER 25 Mg  Contact: [phone]    ----- Message -----  From: Leena Call MA  Sent: 8/16/2023   7:40 AM EDT  To: Martha Yan MA  Subject: FW: Metoprolol Succ ER 25 Mg                       ----- Message -----  From: Paula Mora  Sent: 8/15/2023   6:13 PM EDT  To: [facility] Clinical Staff  Subject: Metoprolol Succ ER 25 Mg                         Dr. Matthew Boo -    I had to stop taking this medicine as it was causing me unusual tiredness and extreme moments of fatigue during daytime. Was concerned about dosing off at wheel while driving as well. Please advise on a path forward.     Thanks,  Bill Lara

## 2023-08-16 NOTE — TELEPHONE ENCOUNTER
----- Message from Jarrod Mejias, [address] sent at 8/16/2023  8:00 AM EDT -----  Regarding: FW: Metoprolol Succ ER 25 Mg  Contact: [phone]    ----- Message -----  From: Agusto Diehl MA  Sent: 8/16/2023   7:40 AM EDT  To: Alli Mcarthur MA  Subject: FW: Metoprolol Succ ER 25 Mg                       ----- Message -----  From: Bryanna Berumen  Sent: 8/15/2023   6:13 PM EDT  To: [facility] Clinical Staff  Subject: Metoprolol Succ ER 25 Mg                         Dr. Ivon Ricketts -    I had to stop taking this medicine as it was causing me unusual tiredness and extreme moments of fatigue during daytime. Was concerned about dosing off at wheel while driving as well. Please advise on a path forward.     Thanks,  Sharon Lopez

## 2023-08-16 NOTE — TELEPHONE ENCOUNTER
Called s/w pt. Pt states that he has noticed some tiredness/sleepiness during the day as well as lower back pain for the past week and a half. Concern with dosing off at the wheel. Begin taking Metoprolol succ 25mg 1 QD  in April for palpitations. States has not noticed much of a difference w/ the palpitations since beginning the Metoprolol. Pt states ok to [facility].

## 2023-08-16 NOTE — TELEPHONE ENCOUNTER
----- Message from Lavonna Romberg, [address] sent at 8/16/2023  8:00 AM EDT -----  Regarding: FW: Metoprolol Succ ER 25 Mg  Contact: [phone]    ----- Message -----  From: Lisa Izquierdo MA  Sent: 8/16/2023   7:40 AM EDT  To: Pantera Elliott MA  Subject: FW: Metoprolol Succ ER 25 Mg                       ----- Message -----  From: Norbert Donato  Sent: 8/15/2023   6:13 PM EDT  To: [facility] Clinical Staff  Subject: Metoprolol Succ ER 25 Mg                         Dr. Mykel Ma -    I had to stop taking this medicine as it was causing me unusual tiredness and extreme moments of fatigue during daytime. Was concerned about dosing off at wheel while driving as well. Please advise on a path forward.     Thanks,  Aníbal Potter

## 2023-08-25 ENCOUNTER — OFFICE VISIT (OUTPATIENT)
Dept: UROLOGY | Age: 48
End: 2023-08-25
Payer: COMMERCIAL

## 2023-08-25 DIAGNOSIS — N40.1 BPH WITH OBSTRUCTION/LOWER URINARY TRACT SYMPTOMS: ICD-10-CM

## 2023-08-25 DIAGNOSIS — I86.1 VARICOCELE: ICD-10-CM

## 2023-08-25 DIAGNOSIS — N13.8 BPH WITH OBSTRUCTION/LOWER URINARY TRACT SYMPTOMS: ICD-10-CM

## 2023-08-25 DIAGNOSIS — N50.82 SCROTAL PAIN: ICD-10-CM

## 2023-08-25 DIAGNOSIS — E78.5 DYSLIPIDEMIA: Primary | ICD-10-CM

## 2023-08-25 DIAGNOSIS — R39.11 URINARY HESITANCY: ICD-10-CM

## 2023-08-25 LAB
BILIRUBIN, URINE, POC: NEGATIVE
BLOOD URINE, POC: NORMAL
GLUCOSE URINE, POC: NEGATIVE
KETONES, URINE, POC: NEGATIVE
LEUKOCYTE ESTERASE, URINE, POC: NORMAL
NITRITE, URINE, POC: NEGATIVE
PH, URINE, POC: 7 (ref 4.6–8)
PROTEIN,URINE, POC: NEGATIVE
SPECIFIC GRAVITY, URINE, POC: 1 (ref 1–1.03)
URINALYSIS CLARITY, POC: NORMAL
URINALYSIS COLOR, POC: NORMAL
UROBILINOGEN, POC: NORMAL

## 2023-08-25 PROCEDURE — 99213 OFFICE O/P EST LOW 20 MIN: CPT | Performed by: UROLOGY

## 2023-08-25 PROCEDURE — 81003 URINALYSIS AUTO W/O SCOPE: CPT | Performed by: UROLOGY

## 2023-08-25 RX ORDER — ALFUZOSIN HYDROCHLORIDE 10 MG/1
10 TABLET, EXTENDED RELEASE ORAL DAILY
Qty: 30 TABLET | Refills: 11 | Status: SHIPPED | OUTPATIENT
Start: 2023-08-25

## 2023-08-25 NOTE — PROGRESS NOTES
[facility]  [address]  [phone]    Malaika Little  : 1975    Chief Complaint   Patient presents with    Results        HPI     Malaika Little is a 51 y.o. male    was last seen by me in 2016; note: transferring care from [facility] for prostatitis and LUTS. Last course of antibiotics for prostatitis was 2015 with Dr. De La Rosa Neighbours. Symptoms usually improve on antibiotics. He currently is having a dull ache in rectal ache, weak stream, RLQ pain and left upper back pain. Also pain with intercourse. Drink about 4-5 cups of coffee per day. No history of hernias. Denies family history of prostate cancer. He has tried TXU Luis Antonio but not alpha blockers. Most recent PSA 0.7 on 14. He has had an extensive urologic work-up Including cystoscopy and urodynamics several year by Dr. Maria Ines Hi (these records are not available). Also a renal US at [facility]. Was given Cipro and Tamsulosin in 11-15 by NP. He is better today,did not take the Tamuslosin. Is concerned about a spermatocele which he has had for some time. It is occasionally painful, no pain today. Referred back by Dr. Kiran Simon for evaluation and treatment of urinary hesitancy. He has urinary hesitancy since his 20's,. Has left groin pain. , bilateral low back pain. No dysuria. Has pain in left scrotum after sex. Exam showed varicocele and spermatocele on left. He was treated with Cipro in . Scrotal US in : FINDINGS: The right testicle measures 5.4 x 2.4 x 3.1 cm while the left testicle  measures 4.4 x 2.4 x 3.0 cm. There is normal flow and echogenicity within the  testicles. There is a cyst versus spermatocele within the left epididymal head  measuring 1.7 cm. There is normal flow within the epididymides. Bilateral  hydroceles are present, right greater than left. There is also a left-sided  varicocele present. IMPRESSION:  1.  Bilateral hydroceles, right greater

## 2023-10-03 RX ORDER — AMLODIPINE BESYLATE 5 MG/1
TABLET ORAL
Qty: 90 TABLET | Refills: 3 | Status: SHIPPED | OUTPATIENT
Start: 2023-10-03

## 2023-10-24 ENCOUNTER — OFFICE VISIT (OUTPATIENT)
Age: 48
End: 2023-10-24
Payer: COMMERCIAL

## 2023-10-24 VITALS
DIASTOLIC BLOOD PRESSURE: 80 MMHG | BODY MASS INDEX: 25.58 KG/M2 | HEIGHT: 73 IN | HEART RATE: 78 BPM | WEIGHT: 193 LBS | SYSTOLIC BLOOD PRESSURE: 116 MMHG

## 2023-10-24 DIAGNOSIS — I49.3 FREQUENT PVCS: Primary | ICD-10-CM

## 2023-10-24 DIAGNOSIS — I10 ESSENTIAL HYPERTENSION: ICD-10-CM

## 2023-10-24 DIAGNOSIS — R07.9 CHEST PAIN, UNSPECIFIED TYPE: ICD-10-CM

## 2023-10-24 PROCEDURE — 3074F SYST BP LT 130 MM HG: CPT | Performed by: INTERNAL MEDICINE

## 2023-10-24 PROCEDURE — 3079F DIAST BP 80-89 MM HG: CPT | Performed by: INTERNAL MEDICINE

## 2023-10-24 PROCEDURE — 99214 OFFICE O/P EST MOD 30 MIN: CPT | Performed by: INTERNAL MEDICINE

## 2023-10-24 ASSESSMENT — ENCOUNTER SYMPTOMS
BACK PAIN: 0
HEMATEMESIS: 0
VOMITING: 0
COLOR CHANGE: 0
WHEEZING: 0
SPUTUM PRODUCTION: 0
DIARRHEA: 0
NAUSEA: 0
BOWEL INCONTINENCE: 0
HEMATOCHEZIA: 0
HOARSE VOICE: 0
ABDOMINAL PAIN: 0
SHORTNESS OF BREATH: 0
COUGH: 0
ORTHOPNEA: 0
BLURRED VISION: 0

## 2023-10-24 NOTE — PROGRESS NOTES
[facility] CARDIOLOGY  [id number] [place] [address] [facility]  PHONE: [phone]        10/24/23        NAME:  Tramaine Regan  : 1975  MRN: [id number]       SUBJECTIVE:   Tramaine Regan is a 51 y.o. male seen for a follow up visit regarding the following: The patient has  frequent PVC's & primary hypertension. He returns for scheduled follow up. He reports intolerance to Toprol due to excessive fatigue. He admits to occasional unexplained brief chest pain. He states that he had an abnormal cardiac calcium score in the past.He does not remember what his SEBASTIAN was. Chief Complaint   Patient presents with    Hypertension       HPI:    Hypertension  This is a chronic problem. The problem is unchanged. The problem is controlled. Associated symptoms include chest pain and palpitations (rare). Pertinent negatives include no anxiety, blurred vision, headaches, malaise/fatigue, neck pain, orthopnea, peripheral edema, PND, shortness of breath or sweats. Chest Pain   This is a recurrent problem. The onset quality is gradual. The problem occurs rarely. The problem has been resolved. The pain is present in the substernal region. The pain is at a severity of 1/10. The pain is mild. The quality of the pain is described as dull. The pain does not radiate. Associated symptoms include palpitations (rare). Pertinent negatives include no abdominal pain, back pain, claudication, cough, diaphoresis, dizziness, exertional chest pressure, fever, headaches, irregular heartbeat, leg pain, lower extremity edema, malaise/fatigue, nausea, near-syncope, numbness, orthopnea, PND, shortness of breath, sputum production, syncope, vomiting or weakness. Past Medical History, Past Surgical History, Family history, Social History, and Medications were all reviewed with the patient today and updated as necessary.          Current Outpatient Medications:     amLODIPine (NORVASC) 5 MG tablet, TAKE 1 TABLET BY MOUTH EVERY DAY,

## 2024-02-20 ENCOUNTER — OFFICE VISIT (OUTPATIENT)
Age: 49
End: 2024-02-20
Payer: COMMERCIAL

## 2024-02-20 VITALS
WEIGHT: 193 LBS | HEIGHT: 72 IN | BODY MASS INDEX: 26.14 KG/M2 | DIASTOLIC BLOOD PRESSURE: 86 MMHG | HEART RATE: 67 BPM | SYSTOLIC BLOOD PRESSURE: 130 MMHG

## 2024-02-20 DIAGNOSIS — I49.3 FREQUENT PVCS: ICD-10-CM

## 2024-02-20 DIAGNOSIS — R07.9 CHEST PAIN, UNSPECIFIED TYPE: Primary | ICD-10-CM

## 2024-02-20 DIAGNOSIS — I10 ESSENTIAL HYPERTENSION: ICD-10-CM

## 2024-02-20 PROCEDURE — 3075F SYST BP GE 130 - 139MM HG: CPT | Performed by: INTERNAL MEDICINE

## 2024-02-20 PROCEDURE — 93000 ELECTROCARDIOGRAM COMPLETE: CPT | Performed by: INTERNAL MEDICINE

## 2024-02-20 PROCEDURE — 3079F DIAST BP 80-89 MM HG: CPT | Performed by: INTERNAL MEDICINE

## 2024-02-20 PROCEDURE — 99213 OFFICE O/P EST LOW 20 MIN: CPT | Performed by: INTERNAL MEDICINE

## 2024-02-20 ASSESSMENT — ENCOUNTER SYMPTOMS
BOWEL INCONTINENCE: 0
HEMATEMESIS: 0
BACK PAIN: 0
SHORTNESS OF BREATH: 0
COLOR CHANGE: 0
ORTHOPNEA: 0
SPUTUM PRODUCTION: 0
ABDOMINAL PAIN: 0
DIARRHEA: 0
HOARSE VOICE: 0
BLURRED VISION: 0
HEMOPTYSIS: 0
NAUSEA: 0
WHEEZING: 0
VOMITING: 0
COUGH: 0
HEMATOCHEZIA: 0

## 2024-02-20 NOTE — PROGRESS NOTES
HENT:      Head: Normocephalic and atraumatic.      Nose: Nose normal.   Eyes:      Extraocular Movements: Extraocular movements intact.      Pupils: Pupils are equal, round, and reactive to light.   Neck:      Vascular: No carotid bruit.   Cardiovascular:      Rate and Rhythm: Regular rhythm.      Pulses: Normal pulses.      Heart sounds: No murmur heard.     Comments: Occasional extrasystole  Pulmonary:      Effort: Pulmonary effort is normal.      Breath sounds: Normal breath sounds.   Abdominal:      General: Abdomen is flat. Bowel sounds are normal.      Palpations: Abdomen is soft.   Musculoskeletal:         General: Normal range of motion.      Cervical back: Normal range of motion and neck supple.   Skin:     General: Skin is warm and dry.   Neurological:      General: No focal deficit present.      Mental Status: He is alert and oriented to person, place, and time.   Psychiatric:         Mood and Affect: Mood normal.         Medical problems and test results were reviewed with the patient today.     No results found for this or any previous visit (from the past 672 hour(s)).  Lab Results   Component Value Date/Time    CHOL 211 06/02/2023 09:14 AM    HDL 38 06/02/2023 09:14 AM     No results found for any visits on 02/20/24.    ASSESSMENT and PLAN    Goldy was seen today for irregular heart beat.    Diagnoses and all orders for this visit:    Chest pain, unspecified type:Atypical.Normal stress MPI scan is c/w CV risk.Continue heart healthy lifestyle and PCP follow up.  -     EKG 12 lead    Frequent PVCs/PAC's:Declines Beta blockers.Consider switching Norvasc to Cardizem in future if symptoms worsen.He admits to minimal symptoms at this time.    Essential hypertension:Stable and at goal with Norvasc.Continue home BP monitoring.          Disposition:    Return if symptoms worsen or fail to improve.                PATITO BAH MD  2/20/2024  11:27 AM

## 2024-04-09 SDOH — ECONOMIC STABILITY: INCOME INSECURITY: HOW HARD IS IT FOR YOU TO PAY FOR THE VERY BASICS LIKE FOOD, HOUSING, MEDICAL CARE, AND HEATING?: NOT HARD AT ALL

## 2024-04-09 SDOH — ECONOMIC STABILITY: FOOD INSECURITY: WITHIN THE PAST 12 MONTHS, THE FOOD YOU BOUGHT JUST DIDN'T LAST AND YOU DIDN'T HAVE MONEY TO GET MORE.: NEVER TRUE

## 2024-04-09 SDOH — ECONOMIC STABILITY: FOOD INSECURITY: WITHIN THE PAST 12 MONTHS, YOU WORRIED THAT YOUR FOOD WOULD RUN OUT BEFORE YOU GOT MONEY TO BUY MORE.: NEVER TRUE

## 2024-04-09 ASSESSMENT — PATIENT HEALTH QUESTIONNAIRE - PHQ9
1. LITTLE INTEREST OR PLEASURE IN DOING THINGS: NOT AT ALL
SUM OF ALL RESPONSES TO PHQ QUESTIONS 1-9: 0
SUM OF ALL RESPONSES TO PHQ QUESTIONS 1-9: 0
SUM OF ALL RESPONSES TO PHQ9 QUESTIONS 1 & 2: 0
SUM OF ALL RESPONSES TO PHQ9 QUESTIONS 1 & 2: 0
1. LITTLE INTEREST OR PLEASURE IN DOING THINGS: NOT AT ALL
2. FEELING DOWN, DEPRESSED OR HOPELESS: NOT AT ALL
SUM OF ALL RESPONSES TO PHQ QUESTIONS 1-9: 0
SUM OF ALL RESPONSES TO PHQ QUESTIONS 1-9: 0
2. FEELING DOWN, DEPRESSED OR HOPELESS: NOT AT ALL

## 2024-04-12 ENCOUNTER — OFFICE VISIT (OUTPATIENT)
Dept: INTERNAL MEDICINE CLINIC | Facility: CLINIC | Age: 49
End: 2024-04-12
Payer: COMMERCIAL

## 2024-04-12 VITALS
HEART RATE: 71 BPM | DIASTOLIC BLOOD PRESSURE: 84 MMHG | WEIGHT: 170 LBS | HEIGHT: 73 IN | OXYGEN SATURATION: 98 % | SYSTOLIC BLOOD PRESSURE: 132 MMHG | BODY MASS INDEX: 22.53 KG/M2

## 2024-04-12 DIAGNOSIS — I49.3 FREQUENT PVCS: ICD-10-CM

## 2024-04-12 DIAGNOSIS — I10 ESSENTIAL HYPERTENSION: Primary | ICD-10-CM

## 2024-04-12 PROCEDURE — 99214 OFFICE O/P EST MOD 30 MIN: CPT | Performed by: INTERNAL MEDICINE

## 2024-04-12 PROCEDURE — 3079F DIAST BP 80-89 MM HG: CPT | Performed by: INTERNAL MEDICINE

## 2024-04-12 PROCEDURE — 3075F SYST BP GE 130 - 139MM HG: CPT | Performed by: INTERNAL MEDICINE

## 2024-04-12 RX ORDER — DILTIAZEM HYDROCHLORIDE 180 MG/1
180 CAPSULE, COATED, EXTENDED RELEASE ORAL DAILY
Qty: 30 CAPSULE | Refills: 3 | Status: SHIPPED | OUTPATIENT
Start: 2024-04-12

## 2024-04-12 ASSESSMENT — ENCOUNTER SYMPTOMS
COUGH: 0
SHORTNESS OF BREATH: 0
ABDOMINAL PAIN: 0

## 2024-04-12 NOTE — PROGRESS NOTES
Blood Pressure Mother     Depression Mother     Anxiety Disorder Mother     High Cholesterol Father     High Blood Pressure Father     Heart Disease Father     Heart Attack Father 56     Social History     Tobacco Use    Smoking status: Never    Smokeless tobacco: Never   Substance Use Topics    Alcohol use: Never         Review of Systems   Constitutional:  Negative for fever.   Respiratory:  Negative for cough and shortness of breath.    Cardiovascular:  Negative for chest pain and leg swelling.   Gastrointestinal:  Negative for abdominal pain.   Psychiatric/Behavioral:  Negative for behavioral problems and confusion.          OBJECTIVE:  /84   Pulse 71   Ht 1.854 m (6' 1\")   Wt 77.1 kg (170 lb)   SpO2 98%   BMI 22.43 kg/m²      Physical Exam  Vitals and nursing note reviewed.   Constitutional:       General: He is not in acute distress.  Cardiovascular:      Rate and Rhythm: Normal rate and regular rhythm.   Pulmonary:      Effort: Pulmonary effort is normal.      Breath sounds: No wheezing.   Neurological:      General: No focal deficit present.      Mental Status: He is oriented to person, place, and time.   Psychiatric:         Mood and Affect: Mood normal.         Behavior: Behavior normal.         Medical problems and test results were reviewed with the patient today.     No results found for this or any previous visit (from the past 672 hour(s)).      ASSESSMENT and PLAN    Goldy was seen today for chest pain.    Diagnoses and all orders for this visit:    Essential hypertension    Frequent PVCs    Other orders  -     dilTIAZem (CARTIA XT) 180 MG extended release capsule; Take 1 capsule by mouth daily        Return in about 6 weeks (around 5/24/2024) for Chronic Condition follow up .     It took more than 30 min to care for this pt today  Over 50% of today's office visit was spent in face to face time in counseling   (may include anyor all of the following: reviewing test results, prognosis,

## 2024-05-22 LAB
ALBUMIN SERPL-MCNC: 4.2 G/DL (ref 4.1–5.1)
ALBUMIN/GLOB SERPL: 1.8 {RATIO} (ref 1.2–2.2)
ALP SERPL-CCNC: 92 IU/L (ref 44–121)
ALT SERPL-CCNC: 31 IU/L (ref 0–44)
AST SERPL-CCNC: 25 IU/L (ref 0–40)
BASOPHILS # BLD AUTO: 0.1 X10E3/UL (ref 0–0.2)
BASOPHILS NFR BLD AUTO: 1 %
BILIRUB SERPL-MCNC: 0.4 MG/DL (ref 0–1.2)
BUN SERPL-MCNC: 12 MG/DL (ref 6–24)
BUN/CREAT SERPL: 13 (ref 9–20)
CALCIUM SERPL-MCNC: 9 MG/DL (ref 8.7–10.2)
CHLORIDE SERPL-SCNC: 107 MMOL/L (ref 96–106)
CHOLEST SERPL-MCNC: 192 MG/DL (ref 100–199)
CO2 SERPL-SCNC: 22 MMOL/L (ref 20–29)
CREAT SERPL-MCNC: 0.94 MG/DL (ref 0.76–1.27)
EGFRCR SERPLBLD CKD-EPI 2021: 99 ML/MIN/1.73
EOSINOPHIL # BLD AUTO: 0.2 X10E3/UL (ref 0–0.4)
EOSINOPHIL NFR BLD AUTO: 5 %
ERYTHROCYTE [DISTWIDTH] IN BLOOD BY AUTOMATED COUNT: 13.4 % (ref 11.6–15.4)
GLOBULIN SER CALC-MCNC: 2.4 G/DL (ref 1.5–4.5)
GLUCOSE SERPL-MCNC: 98 MG/DL (ref 70–99)
HCT VFR BLD AUTO: 45.6 % (ref 37.5–51)
HDLC SERPL-MCNC: 35 MG/DL
HGB BLD-MCNC: 15.7 G/DL (ref 13–17.7)
IMM GRANULOCYTES # BLD AUTO: 0 X10E3/UL (ref 0–0.1)
IMM GRANULOCYTES NFR BLD AUTO: 0 %
IMP & REVIEW OF LAB RESULTS: NORMAL
LDLC SERPL CALC-MCNC: 145 MG/DL (ref 0–99)
LYMPHOCYTES # BLD AUTO: 1.7 X10E3/UL (ref 0.7–3.1)
LYMPHOCYTES NFR BLD AUTO: 38 %
MCH RBC QN AUTO: 30.9 PG (ref 26.6–33)
MCHC RBC AUTO-ENTMCNC: 34.4 G/DL (ref 31.5–35.7)
MCV RBC AUTO: 90 FL (ref 79–97)
MONOCYTES # BLD AUTO: 0.5 X10E3/UL (ref 0.1–0.9)
MONOCYTES NFR BLD AUTO: 10 %
NEUTROPHILS # BLD AUTO: 2 X10E3/UL (ref 1.4–7)
NEUTROPHILS NFR BLD AUTO: 46 %
PLATELET # BLD AUTO: 220 X10E3/UL (ref 150–450)
POTASSIUM SERPL-SCNC: 4.2 MMOL/L (ref 3.5–5.2)
PROT SERPL-MCNC: 6.6 G/DL (ref 6–8.5)
RBC # BLD AUTO: 5.08 X10E6/UL (ref 4.14–5.8)
SODIUM SERPL-SCNC: 142 MMOL/L (ref 134–144)
SPECIMEN STATUS REPORT: NORMAL
TRIGL SERPL-MCNC: 64 MG/DL (ref 0–149)
VLDLC SERPL CALC-MCNC: 12 MG/DL (ref 5–40)
WBC # BLD AUTO: 4.5 X10E3/UL (ref 3.4–10.8)

## 2024-05-29 ENCOUNTER — TELEMEDICINE (OUTPATIENT)
Dept: INTERNAL MEDICINE CLINIC | Facility: CLINIC | Age: 49
End: 2024-05-29
Payer: COMMERCIAL

## 2024-05-29 DIAGNOSIS — R07.9 CHEST PAIN, UNSPECIFIED TYPE: Primary | ICD-10-CM

## 2024-05-29 DIAGNOSIS — F41.9 ANXIETY: ICD-10-CM

## 2024-05-29 DIAGNOSIS — R63.4 WEIGHT LOSS: ICD-10-CM

## 2024-05-29 PROCEDURE — 99214 OFFICE O/P EST MOD 30 MIN: CPT | Performed by: STUDENT IN AN ORGANIZED HEALTH CARE EDUCATION/TRAINING PROGRAM

## 2024-05-29 RX ORDER — ESCITALOPRAM OXALATE 10 MG/1
TABLET ORAL
Qty: 90 TABLET | Refills: 1 | Status: SHIPPED | OUTPATIENT
Start: 2024-05-29

## 2024-05-29 RX ORDER — AMLODIPINE BESYLATE 5 MG/1
5 TABLET ORAL DAILY
COMMUNITY
Start: 2024-05-10

## 2024-05-29 ASSESSMENT — PATIENT HEALTH QUESTIONNAIRE - PHQ9
SUM OF ALL RESPONSES TO PHQ9 QUESTIONS 1 & 2: 0
SUM OF ALL RESPONSES TO PHQ QUESTIONS 1-9: 0
SUM OF ALL RESPONSES TO PHQ QUESTIONS 1-9: 0
2. FEELING DOWN, DEPRESSED OR HOPELESS: NOT AT ALL
SUM OF ALL RESPONSES TO PHQ QUESTIONS 1-9: 0
SUM OF ALL RESPONSES TO PHQ QUESTIONS 1-9: 0
1. LITTLE INTEREST OR PLEASURE IN DOING THINGS: NOT AT ALL

## 2024-05-29 NOTE — PROGRESS NOTES
Goldy Small (:  1975) is seen via virtual visit today for evaluation of the following:   Chief Complaint   Patient presents with    Hypertension    Discuss Labs    Anxiety   .      HPI:    Dull L sided chest pain 1-2 years, now is constant previously was intermittent, sometimes worse on the treadmill or if he eats something cold or sugary, no injury, had CXR 2023. No pain with palpation of the area. On omeprazole for reflux.  He saw cardiology and had normal exercise stress test.  Tried diltiazem which made the pain worse.    He has lost weight, no bloody cough, night sweats    Endorses dry cough but does have allergies    The following portions of the patient's history were reviewed and updated as appropriate:      Current Outpatient Medications   Medication Sig Dispense Refill    amLODIPine (NORVASC) 5 MG tablet Take 1 tablet by mouth daily      escitalopram (LEXAPRO) 10 MG tablet Take half a tablet daily for 7 days, then take one tablet daily 90 tablet 1    omeprazole (PRILOSEC) 40 MG delayed release capsule Take 1 capsule by mouth every morning (before breakfast) 90 capsule 5    fluticasone (FLONASE) 50 MCG/ACT nasal spray 2 sprays by Each Nostril route daily 48 g 5    Omega-3 Fatty Acids (FISH OIL) 1200 MG CAPS Take 2,400 mg by mouth daily      NONFORMULARY Take 40 mg by mouth daily [facility] magnesium       No current facility-administered medications for this visit.     Patient Active Problem List    Diagnosis Date Noted    Urinary hesitancy 2023    Near syncope 2023    Frequent PVCs 2023    Chest pain 2023    Essential hypertension 2023    Dyslipidemia 2023    History of venous thrombosis 2023    Deviated septum 2023    Fatigue 2014    Bone pain 2014    Peripheral neuropathy 2014    Joint pain 2014    Memory difficulties 2014    Environmental allergies 2014    Hoarseness 2014      Past Medical History:

## 2024-06-17 ENCOUNTER — PATIENT MESSAGE (OUTPATIENT)
Dept: INTERNAL MEDICINE CLINIC | Facility: CLINIC | Age: 49
End: 2024-06-17

## 2024-06-17 DIAGNOSIS — F41.9 ANXIETY: Primary | ICD-10-CM

## 2024-06-21 RX ORDER — FLUOXETINE HYDROCHLORIDE 20 MG/1
20 CAPSULE ORAL DAILY
Qty: 30 CAPSULE | Refills: 1 | Status: SHIPPED | OUTPATIENT
Start: 2024-06-21

## 2024-06-26 DIAGNOSIS — R07.9 CHEST PAIN, UNSPECIFIED TYPE: ICD-10-CM

## 2024-06-26 DIAGNOSIS — R63.4 WEIGHT LOSS: ICD-10-CM

## 2024-07-12 RX ORDER — OMEPRAZOLE 40 MG/1
40 CAPSULE, DELAYED RELEASE ORAL
Qty: 30 CAPSULE | Refills: 17 | Status: SHIPPED | OUTPATIENT
Start: 2024-07-12

## 2024-08-12 RX ORDER — FLUOXETINE HYDROCHLORIDE 20 MG/1
CAPSULE ORAL DAILY
Qty: 90 CAPSULE | Refills: 0 | Status: SHIPPED | OUTPATIENT
Start: 2024-08-12

## 2024-08-12 NOTE — TELEPHONE ENCOUNTER
Dr Clinton approved refill for 90 day supply, but said pt needs f/u for additional refills. Please have him schedule.

## 2024-09-16 RX ORDER — AMLODIPINE BESYLATE 5 MG/1
5 TABLET ORAL DAILY
Qty: 30 TABLET | Refills: 5 | Status: SHIPPED | OUTPATIENT
Start: 2024-09-16

## 2025-01-25 ENCOUNTER — TELEPHONE (OUTPATIENT)
Dept: INTERNAL MEDICINE CLINIC | Facility: CLINIC | Age: 50
End: 2025-01-25

## 2025-01-27 RX ORDER — AMLODIPINE BESYLATE 5 MG/1
5 TABLET ORAL DAILY
Qty: 90 TABLET | Refills: 0 | Status: SHIPPED | OUTPATIENT
Start: 2025-01-27

## 2025-01-27 RX ORDER — OMEPRAZOLE 40 MG/1
40 CAPSULE, DELAYED RELEASE ORAL DAILY
Qty: 90 CAPSULE | Refills: 5 | Status: SHIPPED | OUTPATIENT
Start: 2025-01-27

## 2025-01-29 NOTE — TELEPHONE ENCOUNTER
I tried calling pt to get him scheduled for a f/u visit. No answer and unable to leave a message as the voice mailbox is full. He has not been on mychart since June 2024.     Please schedule pt for f/u for additional refills. Dr Clinton approved 90 day supply.

## 2025-05-01 RX ORDER — AMLODIPINE BESYLATE 5 MG/1
5 TABLET ORAL DAILY
Qty: 90 TABLET | Refills: 3 | OUTPATIENT
Start: 2025-05-01

## 2025-05-05 ENCOUNTER — OFFICE VISIT (OUTPATIENT)
Dept: ORTHOPEDIC SURGERY | Age: 50
End: 2025-05-05
Payer: COMMERCIAL

## 2025-05-05 DIAGNOSIS — M79.671 BILATERAL FOOT PAIN: Primary | ICD-10-CM

## 2025-05-05 DIAGNOSIS — M77.41 METATARSALGIA OF BOTH FEET: ICD-10-CM

## 2025-05-05 DIAGNOSIS — M76.61 ACHILLES TENDINITIS, RIGHT LEG: ICD-10-CM

## 2025-05-05 DIAGNOSIS — M77.42 METATARSALGIA OF BOTH FEET: ICD-10-CM

## 2025-05-05 DIAGNOSIS — M76.62 ACHILLES TENDINITIS, LEFT LEG: ICD-10-CM

## 2025-05-05 DIAGNOSIS — M79.672 BILATERAL FOOT PAIN: Primary | ICD-10-CM

## 2025-05-05 PROCEDURE — 99204 OFFICE O/P NEW MOD 45 MIN: CPT | Performed by: ORTHOPAEDIC SURGERY

## 2025-05-05 NOTE — PROGRESS NOTES
Name: Goldy Small  YOB: 1975  Gender: male  MRN: [id number]    Summary:   Bilateral forefoot metatarsalgia, right insertional heel tendinitis       CC: New Patient (Bilateral midfoot/achilles pain after running. Xray obtained in office.)       HPI: Goldy Small is a 49 y.o. male who presents with New Patient (Bilateral midfoot/achilles pain after running. Xray obtained in office.)  .  This patient presents to the office today with a 2-month history of pain located in the posterior heel and bilateral forefeet after he started doing some running.    History was obtained by Patient     ROS/Meds/PSH/PMH/FH/SH: I personally reviewed the patients standard intake form.  Below are the pertinents    Tobacco:  reports that he has never smoked. He has never used smokeless tobacco.  Diabetes: None      Physical Examination:  Exam of the bilateral feet shows a positive Achilles contracture bilaterally.  Some mild tenderness over the Achilles insertion only on the right.  He has had bilateral metatarsalgia with pain located below the lesser metatarsal heads.  No sign of Mckeon's neuroma or significant claw toes identified.  He has palpable pulses and intact sensation.      Imaging:   Interpretation of imaging  Bilateral feet and ankle XR: AP, Lateral, Oblique views     ICD-10-CM    1. Bilateral foot pain  M79.671 XR ANKLE STANDARD BILATERAL    M79.672 XR FOOT LIMITED BILATERAL      2. Metatarsalgia of both feet  M77.41     M77.42       3. Achilles tendinitis, left leg  M76.62       4. Achilles tendinitis, right leg  M76.61          Report: AP, lateral, oblique x-ray of the bilateral feet and ankles demonstrates small insertional Achilles tendinitis    Impression: Small insertional Achilles tendinitis   SHAILA LU III, MD           Assessment:   Right insertional Achilles tendinitis, bilateral forefoot metatarsalgia    Treatment Plan:   4 This is a chronic illness/condition with exacerbation and

## 2025-05-12 ENCOUNTER — OFFICE VISIT (OUTPATIENT)
Dept: INTERNAL MEDICINE CLINIC | Facility: CLINIC | Age: 50
End: 2025-05-12
Payer: COMMERCIAL

## 2025-05-12 VITALS
WEIGHT: 206 LBS | HEIGHT: 73 IN | SYSTOLIC BLOOD PRESSURE: 124 MMHG | BODY MASS INDEX: 27.3 KG/M2 | DIASTOLIC BLOOD PRESSURE: 82 MMHG

## 2025-05-12 DIAGNOSIS — Z86.0100 HISTORY OF COLON POLYPS: ICD-10-CM

## 2025-05-12 DIAGNOSIS — Z00.00 PHYSICAL EXAM: ICD-10-CM

## 2025-05-12 DIAGNOSIS — Z12.11 ENCOUNTER FOR SCREENING FOR MALIGNANT NEOPLASM OF COLON: Primary | ICD-10-CM

## 2025-05-12 DIAGNOSIS — K62.89 RECTAL PAIN: ICD-10-CM

## 2025-05-12 DIAGNOSIS — I10 ESSENTIAL HYPERTENSION: ICD-10-CM

## 2025-05-12 PROCEDURE — 99214 OFFICE O/P EST MOD 30 MIN: CPT | Performed by: STUDENT IN AN ORGANIZED HEALTH CARE EDUCATION/TRAINING PROGRAM

## 2025-05-12 PROCEDURE — 3079F DIAST BP 80-89 MM HG: CPT | Performed by: STUDENT IN AN ORGANIZED HEALTH CARE EDUCATION/TRAINING PROGRAM

## 2025-05-12 PROCEDURE — 3074F SYST BP LT 130 MM HG: CPT | Performed by: STUDENT IN AN ORGANIZED HEALTH CARE EDUCATION/TRAINING PROGRAM

## 2025-05-12 RX ORDER — AMLODIPINE BESYLATE 5 MG/1
5 TABLET ORAL DAILY
Qty: 90 TABLET | Refills: 1 | Status: SHIPPED | OUTPATIENT
Start: 2025-05-12

## 2025-05-12 SDOH — ECONOMIC STABILITY: INCOME INSECURITY: IN THE LAST 12 MONTHS, WAS THERE A TIME WHEN YOU WERE NOT ABLE TO PAY THE MORTGAGE OR RENT ON TIME?: NO

## 2025-05-12 SDOH — ECONOMIC STABILITY: FOOD INSECURITY: WITHIN THE PAST 12 MONTHS, YOU WORRIED THAT YOUR FOOD WOULD RUN OUT BEFORE YOU GOT MONEY TO BUY MORE.: NEVER TRUE

## 2025-05-12 SDOH — ECONOMIC STABILITY: FOOD INSECURITY: WITHIN THE PAST 12 MONTHS, THE FOOD YOU BOUGHT JUST DIDN'T LAST AND YOU DIDN'T HAVE MONEY TO GET MORE.: NEVER TRUE

## 2025-05-12 SDOH — ECONOMIC STABILITY: TRANSPORTATION INSECURITY
IN THE PAST 12 MONTHS, HAS THE LACK OF TRANSPORTATION KEPT YOU FROM MEDICAL APPOINTMENTS OR FROM GETTING MEDICATIONS?: NO

## 2025-05-12 ASSESSMENT — PATIENT HEALTH QUESTIONNAIRE - PHQ9
2. FEELING DOWN, DEPRESSED OR HOPELESS: NOT AT ALL
2. FEELING DOWN, DEPRESSED OR HOPELESS: NOT AT ALL
SUM OF ALL RESPONSES TO PHQ QUESTIONS 1-9: 0
SUM OF ALL RESPONSES TO PHQ QUESTIONS 1-9: 0
SUM OF ALL RESPONSES TO PHQ9 QUESTIONS 1 & 2: 0
1. LITTLE INTEREST OR PLEASURE IN DOING THINGS: NOT AT ALL
1. LITTLE INTEREST OR PLEASURE IN DOING THINGS: NOT AT ALL
SUM OF ALL RESPONSES TO PHQ QUESTIONS 1-9: 0
SUM OF ALL RESPONSES TO PHQ QUESTIONS 1-9: 0

## 2025-05-12 NOTE — PROGRESS NOTES
[facility]    History of colon polyps  -     [facility]    Essential hypertension  -     Lipid Panel; Future  -     Comprehensive Metabolic Panel; Future  -     CBC with Auto Differential; Future  -     TSH reflex to FT4; Future  -     Urinalysis; Future    Physical exam    Rectal pain  -     [facility]    Other orders  -     amLODIPine (NORVASC) 5 MG tablet; Take 1 tablet by mouth daily  -     FLUoxetine (PROZAC) 20 MG capsule; Take 1 capsule by mouth daily        FOLLOW UP    Return in about 6 months (around 11/12/2025) for physical, with fasting labs 1 week prior.     The patient (or guardian, if applicable) and other individuals in attendance with the patient were advised that Artificial Intelligence will be utilized during this visit to record, process the conversation to generate a clinical note, and support improvement of the AI technology. The patient (or guardian, if applicable) and other individuals in attendance at the appointment consented to the use of AI, including the recording.

## 2025-05-21 ENCOUNTER — PREP FOR PROCEDURE (OUTPATIENT)
Age: 50
End: 2025-05-21

## 2025-05-21 DIAGNOSIS — Z12.11 ENCOUNTER FOR SCREENING COLONOSCOPY: ICD-10-CM

## 2025-05-22 PROBLEM — Z12.11 ENCOUNTER FOR SCREENING COLONOSCOPY: Status: ACTIVE | Noted: 2025-05-21

## 2025-05-22 RX ORDER — SODIUM CHLORIDE 0.9 % (FLUSH) 0.9 %
5-40 SYRINGE (ML) INJECTION EVERY 12 HOURS SCHEDULED
Status: CANCELLED | OUTPATIENT
Start: 2025-05-22

## 2025-05-22 RX ORDER — SODIUM CHLORIDE 0.9 % (FLUSH) 0.9 %
5-40 SYRINGE (ML) INJECTION PRN
Status: CANCELLED | OUTPATIENT
Start: 2025-05-22

## 2025-05-22 RX ORDER — SODIUM CHLORIDE 9 MG/ML
25 INJECTION, SOLUTION INTRAVENOUS PRN
Status: CANCELLED | OUTPATIENT
Start: 2025-05-22

## 2025-06-11 NOTE — PROGRESS NOTES
Patient verified name, , and procedure.    Type: 1a; abbreviated assessment per anesthesia guidelines    Labs per anesthesia: none    Instructed pt that they will be notified the day before their procedure by the GI Lab for time of arrival if their procedure is [place] and Pre-op for [place] cases. Arrival times should be called by 5 pm. If no phone is received the patient should contact their respective hospital. The GI lab telephone number is [phone] and ES Pre-op is [phone].     Follow diet and prep instructions per office. Nothing to eat or drink after midnight (this excludes prep).      Bath or shower the night before and the am of surgery with non-moisturizing soap. No lotions, oils, powders, cologne on skin. No make up, eye make up or jewelry. Wear loose fitting comfortable, clean clothing.     Must have adult present in building the entire time .     Medications for the day of procedure Amlodipine, Fluoxetine, Omeprazole, patient to hold none per anesthesia guidelines.     The following discharge instructions reviewed with patient: medication given during procedure may cause drowsiness for several hours, therefore, do not drive or operate machinery for remainder of the day. You may not drink alcohol on the day of your procedure, please resume regular diet and activity unless otherwise directed. You may experience abdominal distention for several hours that is relieved by the passage of gas. Contact your physician if you have any of the following: fever or chills, severe abdominal pain or excessive amount of bleeding or a large amount when having a bowel movement. Occasional specks of blood with bowel movement would not be unusual.

## 2025-06-12 ENCOUNTER — TELEPHONE (OUTPATIENT)
Dept: GASTROENTEROLOGY | Age: 50
End: 2025-06-12

## 2025-06-16 ENCOUNTER — TELEPHONE (OUTPATIENT)
Age: 50
End: 2025-06-16

## 2025-06-16 NOTE — TELEPHONE ENCOUNTER
Returned call back to patient needing to reschedule surgery w/ Ertem to  07/18/2025  (patients choice )

## 2025-06-21 PROBLEM — Z12.11 ENCOUNTER FOR SCREENING COLONOSCOPY: Status: RESOLVED | Noted: 2025-05-21 | Resolved: 2025-06-21

## 2025-07-15 ENCOUNTER — TELEPHONE (OUTPATIENT)
Dept: GASTROENTEROLOGY | Age: 50
End: 2025-07-15

## 2025-07-15 PROBLEM — Z12.11 ENCOUNTER FOR SCREENING COLONOSCOPY: Status: ACTIVE | Noted: 2025-05-21

## 2025-07-15 NOTE — TELEPHONE ENCOUNTER
Patient wife called in ,husbands phone does not work and can not access his My Chart ,he has procedure scheduled on 7/18/25 and needs his instructions.Wife's number is [phone] is the number to call back to.

## 2025-07-17 RX ORDER — OXYCODONE HYDROCHLORIDE 5 MG/1
5 TABLET ORAL
Refills: 0 | Status: CANCELLED | OUTPATIENT
Start: 2025-07-17

## 2025-07-17 RX ORDER — ONDANSETRON 2 MG/ML
4 INJECTION INTRAMUSCULAR; INTRAVENOUS
Status: CANCELLED | OUTPATIENT
Start: 2025-07-17

## 2025-07-17 NOTE — PROGRESS NOTES
RN called Pt to confirm appointment time of 0830, arrival time 0700, location,  requirement, and instructions for registration at the hospital.  Pt verbalized understanding.

## 2025-07-18 ENCOUNTER — HOSPITAL ENCOUNTER (OUTPATIENT)
Age: 50
Discharge: HOME | End: 2025-07-18
Attending: STUDENT IN AN ORGANIZED HEALTH CARE EDUCATION/TRAINING PROGRAM | Admitting: STUDENT IN AN ORGANIZED HEALTH CARE EDUCATION/TRAINING PROGRAM
Payer: COMMERCIAL

## 2025-07-18 ENCOUNTER — ANESTHESIA EVENT (OUTPATIENT)
Dept: ENDOSCOPY | Age: 50
End: 2025-07-18
Payer: COMMERCIAL

## 2025-07-18 ENCOUNTER — ANESTHESIA (OUTPATIENT)
Dept: ENDOSCOPY | Age: 50
End: 2025-07-18
Payer: COMMERCIAL

## 2025-07-18 VITALS
HEIGHT: 73 IN | BODY MASS INDEX: 26.51 KG/M2 | RESPIRATION RATE: 29 BRPM | SYSTOLIC BLOOD PRESSURE: 124 MMHG | DIASTOLIC BLOOD PRESSURE: 85 MMHG | OXYGEN SATURATION: 97 % | TEMPERATURE: 97.7 F | WEIGHT: 200 LBS | HEART RATE: 55 BPM

## 2025-07-18 PROCEDURE — 45378 DIAGNOSTIC COLONOSCOPY: CPT | Performed by: STUDENT IN AN ORGANIZED HEALTH CARE EDUCATION/TRAINING PROGRAM

## 2025-07-18 PROCEDURE — 2580000003 HC RX 258: Performed by: ANESTHESIOLOGY

## 2025-07-18 PROCEDURE — 6360000002 HC RX W HCPCS

## 2025-07-18 PROCEDURE — 7100000010 HC PHASE II RECOVERY - FIRST 15 MIN: Performed by: STUDENT IN AN ORGANIZED HEALTH CARE EDUCATION/TRAINING PROGRAM

## 2025-07-18 PROCEDURE — 7100000011 HC PHASE II RECOVERY - ADDTL 15 MIN: Performed by: STUDENT IN AN ORGANIZED HEALTH CARE EDUCATION/TRAINING PROGRAM

## 2025-07-18 PROCEDURE — 3609027000 HC COLONOSCOPY: Performed by: STUDENT IN AN ORGANIZED HEALTH CARE EDUCATION/TRAINING PROGRAM

## 2025-07-18 PROCEDURE — 3700000001 HC ADD 15 MINUTES (ANESTHESIA): Performed by: STUDENT IN AN ORGANIZED HEALTH CARE EDUCATION/TRAINING PROGRAM

## 2025-07-18 PROCEDURE — 3700000000 HC ANESTHESIA ATTENDED CARE: Performed by: STUDENT IN AN ORGANIZED HEALTH CARE EDUCATION/TRAINING PROGRAM

## 2025-07-18 PROCEDURE — 2709999900 HC NON-CHARGEABLE SUPPLY: Performed by: STUDENT IN AN ORGANIZED HEALTH CARE EDUCATION/TRAINING PROGRAM

## 2025-07-18 RX ORDER — PROPOFOL 10 MG/ML
INJECTION, EMULSION INTRAVENOUS
Status: DISCONTINUED | OUTPATIENT
Start: 2025-07-18 | End: 2025-07-18 | Stop reason: SDUPTHER

## 2025-07-18 RX ORDER — SODIUM CHLORIDE, SODIUM LACTATE, POTASSIUM CHLORIDE, CALCIUM CHLORIDE 600; 310; 30; 20 MG/100ML; MG/100ML; MG/100ML; MG/100ML
INJECTION, SOLUTION INTRAVENOUS CONTINUOUS
Status: DISCONTINUED | OUTPATIENT
Start: 2025-07-18 | End: 2025-07-18 | Stop reason: HOSPADM

## 2025-07-18 RX ORDER — SODIUM CHLORIDE 0.9 % (FLUSH) 0.9 %
5-40 SYRINGE (ML) INJECTION EVERY 12 HOURS SCHEDULED
Status: DISCONTINUED | OUTPATIENT
Start: 2025-07-18 | End: 2025-07-18 | Stop reason: HOSPADM

## 2025-07-18 RX ORDER — SODIUM CHLORIDE 9 MG/ML
25 INJECTION, SOLUTION INTRAVENOUS PRN
Status: DISCONTINUED | OUTPATIENT
Start: 2025-07-18 | End: 2025-07-18 | Stop reason: HOSPADM

## 2025-07-18 RX ORDER — LIDOCAINE HYDROCHLORIDE 10 MG/ML
1 INJECTION, SOLUTION INFILTRATION; PERINEURAL
Status: DISCONTINUED | OUTPATIENT
Start: 2025-07-18 | End: 2025-07-18 | Stop reason: HOSPADM

## 2025-07-18 RX ORDER — LIDOCAINE HYDROCHLORIDE 20 MG/ML
INJECTION, SOLUTION EPIDURAL; INFILTRATION; INTRACAUDAL; PERINEURAL
Status: DISCONTINUED | OUTPATIENT
Start: 2025-07-18 | End: 2025-07-18 | Stop reason: SDUPTHER

## 2025-07-18 RX ORDER — SODIUM CHLORIDE 0.9 % (FLUSH) 0.9 %
5-40 SYRINGE (ML) INJECTION PRN
Status: DISCONTINUED | OUTPATIENT
Start: 2025-07-18 | End: 2025-07-18 | Stop reason: HOSPADM

## 2025-07-18 RX ADMIN — LIDOCAINE HYDROCHLORIDE 50 MG: 20 INJECTION, SOLUTION EPIDURAL; INFILTRATION; INTRACAUDAL; PERINEURAL at 08:16

## 2025-07-18 RX ADMIN — PROPOFOL 180 MCG/KG/MIN: 10 INJECTION, EMULSION INTRAVENOUS at 08:17

## 2025-07-18 RX ADMIN — PROPOFOL 50 MG: 10 INJECTION, EMULSION INTRAVENOUS at 08:16

## 2025-07-18 RX ADMIN — SODIUM CHLORIDE, SODIUM LACTATE, POTASSIUM CHLORIDE, AND CALCIUM CHLORIDE: .6; .31; .03; .02 INJECTION, SOLUTION INTRAVENOUS at 07:18

## 2025-07-18 ASSESSMENT — PAIN - FUNCTIONAL ASSESSMENT: PAIN_FUNCTIONAL_ASSESSMENT: NONE - DENIES PAIN

## 2025-07-18 NOTE — ANESTHESIA POSTPROCEDURE EVALUATION
Department of Anesthesiology  Postprocedure Note    Patient: Goldy Small  MRN: [id number]  YOB: 1975  Date of evaluation: 7/18/2025    Procedure Summary       Date: 07/18/25 Room / Location: [place] [id number] / [facility] ENDOSCOPY    Anesthesia Start: 0814 Anesthesia Stop: 0833    Procedure: COLORECTAL CANCER SCREENING, NOT HIGH RISK Diagnosis:       Encounter for screening colonoscopy      (Encounter for screening colonoscopy [Z12.11])    Surgeons: Mamadou Leon MD Responsible Provider: Arden Sanchez Jr., MD    Anesthesia Type: TIVA ASA Status: 2            Anesthesia Type: No value filed.    Rajendra Phase I: Rajendra Score: 10    Rajendra Phase II: Rajendra Score: 10    Anesthesia Post Evaluation    Patient location during evaluation: PACU  Patient participation: complete - patient participated  Level of consciousness: awake  Pain score: 0  Airway patency: patent  Nausea & Vomiting: no nausea and no vomiting  Cardiovascular status: blood pressure returned to baseline and hemodynamically stable  Respiratory status: acceptable, spontaneous ventilation and nonlabored ventilation  Hydration status: euvolemic  Multimodal analgesia pain management approach  Pain management: adequate    No notable events documented.

## 2025-07-18 NOTE — H&P
Goldy Small is 49 y.o. y/o male here for screening colonoscopy.    No immediate (parents/siblings) FH of colon cancer, no acute symptoms.     Past Medical History:   Diagnosis Date    Allergic rhinitis     Anxiety     Asthma     Back problem     Blood clot in vein     GERD (gastroesophageal reflux disease)     Hyperlipidemia     Hypertension     Neuropathy      Past Surgical History:   Procedure Laterality Date    CHOLECYSTECTOMY      COLONOSCOPY      TONSILLECTOMY       Family History   Problem Relation Age of Onset    High Blood Pressure Mother     Depression Mother     Anxiety Disorder Mother     High Cholesterol Father     High Blood Pressure Father     Heart Disease Father     Heart Attack Father 56     Social History     Tobacco Use    Smoking status: Never    Smokeless tobacco: Never   Vaping Use    Vaping status: Never Used   Substance Use Topics    Alcohol use: Never    Drug use: Never     Allergies   Allergen Reactions    Diltiazem Anxiety     Current Outpatient Medications   Medication Instructions    amLODIPine (NORVASC) 5 mg, Oral, DAILY    FLUoxetine (PROZAC) 20 mg, Oral, DAILY    fluticasone (FLONASE) 50 MCG/ACT nasal spray 2 sprays, Each Nostril, DAILY    omeprazole (PRILOSEC) 40 mg, Oral, DAILY     Review of Systems    ROS:    A complete 11 system ROS was performed and was negative aside from the pertinent negative and positives noted above.     PE:   Vitals:    07/18/25 0712   BP: 134/83   Pulse: 52   Resp: 22   Temp: 98.1 °F (36.7 °C)   SpO2: 95%      General:  The patient appears well-nourished, and is in no acute distress.    Skin:  no rash, ulcers. No Bleeding or signs of infection.  HEENT:  Normocephalic, atraumatic. No sclerae icterus.   Neck:  No pain on palpation or mobilization of the neck.  Respiratory: Respiratory effort is normal. Expansion maintained bilaterally and symmetrically. Normal breath sounds and clear to auscultation bilaterally without wheezes, rales, or rhonchi.

## 2025-07-18 NOTE — ANESTHESIA PRE PROCEDURE
Department of Anesthesiology  Preprocedure Note       Name:  Goldy Small   Age:  49 y.o.  :  1975                                          MRN:  [id number]         Date:  2025      Surgeon: Surgeon(s):  Mamadou Leon MD    Procedure: Procedure(s):  COLORECTAL CANCER SCREENING, NOT HIGH RISK    Medications prior to admission:   Prior to Admission medications    Medication Sig Start Date End Date Taking? Authorizing Provider   amLODIPine (NORVASC) 5 MG tablet Take 1 tablet by mouth daily 25   Aliya Clinton MD   FLUoxetine (PROZAC) 20 MG capsule Take 1 capsule by mouth daily 25   Aliya Clinton MD   omeprazole (PRILOSEC) 40 MG delayed release capsule Take 1 capsule by mouth daily 25   Garrick Shah MD   fluticasone (FLONASE) 50 MCG/ACT nasal spray 2 sprays by Each Nostril route daily 23   Garrick Shah MD       Current medications:    Current Facility-Administered Medications   Medication Dose Route Frequency Provider Last Rate Last Admin    lidocaine 1 % injection 1 mL  1 mL IntraDERmal Once PRN Arden Sanchez Jr., MD        lactated ringers infusion   IntraVENous Continuous Arden Sanchez Jr., MD        sodium chloride flush 0.9 % injection 5-40 mL  5-40 mL IntraVENous 2 times per day Mamadou Leon MD        sodium chloride flush 0.9 % injection 5-40 mL  5-40 mL IntraVENous PRN Mamadou Leon MD        0.9 % sodium chloride infusion  25 mL IntraVENous PRN Mamadou Leon MD           Allergies:    Allergies   Allergen Reactions    Diltiazem Anxiety       Problem List:    Patient Active Problem List   Diagnosis Code    Fatigue R53.83    Bone pain M89.8X9    Peripheral neuropathy G62.9    Joint pain M25.50    Memory difficulties R41.3    Environmental allergies Z91.09    Hoarseness R49.0    Essential hypertension I10    Dyslipidemia E78.5    History of venous thrombosis Z86.718    Near syncope R55    Frequent PVCs I49.3    Chest pain R07.9

## 2025-07-18 NOTE — DISCHARGE INSTRUCTIONS
Gastrointestinal Colonoscopy/Flexible Sigmoidoscopy - Lower Exam Discharge Instructions  Call Dr. Leon at [phone] for any problems or questions.  Contact the doctor’s office for follow up appointment as directed  Medication may cause drowsiness for several hours, therefore, do not drive or operate machinery for remainder of the day.  No alcohol today.  Do not make any important decisions such signing legal paperwork.  Ordinarily, you may resume regular diet and activity after exam unless otherwise specified by your physician.  Because of air put into your colon during exam, you may experience some abdominal distension, relieved by the passage of gas, for several hours.  Contact your physician if you have any of the following:  Excessive amount of bleeding - large amount when having a bowel movement.  Occasional specks of blood with bowel movement would not be unusual.  Severe abdominal pain  Fever or Chills          Instructions given to Goldy Small and other family members.

## (undated) DEVICE — CONNECTOR TBNG OD5-7MM O2 END DISP

## (undated) DEVICE — ENDOSCOPIC KIT 1.1+ OP4 CA DE 2 GWN AAMI LEVEL 3

## (undated) DEVICE — SYRINGE MED 10ML LUERLOCK TIP W/O SFTY DISP

## (undated) DEVICE — DISPOSABLE BIOPSY VALVE MAJ-1555: Brand: SINGLE USE BIOPSY VALVE (STERILE)

## (undated) DEVICE — GAUZE,SPONGE,4"X4",12PLY,WOVEN,NS,LF: Brand: MEDLINE

## (undated) DEVICE — LUBE JELLY FOIL PACK 1.4 OZ: Brand: MEDLINE INDUSTRIES, INC.

## (undated) DEVICE — KENDALL RADIOLUCENT FOAM MONITORING ELECTRODE RECTANGULAR SHAPE: Brand: KENDALL

## (undated) DEVICE — SOLUTION IRRIG 1000ML H2O PIC PLAS SHATTERPROOF CONTAINER

## (undated) DEVICE — SYRINGE MEDICAL 3ML CLEAR PLASTIC STANDARD NON CONTROL LUERLOCK TIP DISPOSABLE

## (undated) DEVICE — AIRLIFE™ OXYGEN TUBING 7 FEET (2.1 M) CRUSH RESISTANT OXYGEN TUBING, VINYL TIPPED: Brand: AIRLIFE™

## (undated) DEVICE — SINGLE PORT MANIFOLD: Brand: NEPTUNE 2

## (undated) DEVICE — NEEDLE SYRINGE 18GA L1.5IN RED PLAS HUB S STL BLNT FILL W/O